# Patient Record
Sex: FEMALE | Race: WHITE | Employment: OTHER | URBAN - METROPOLITAN AREA
[De-identification: names, ages, dates, MRNs, and addresses within clinical notes are randomized per-mention and may not be internally consistent; named-entity substitution may affect disease eponyms.]

---

## 2017-08-29 PROBLEM — E55.9 VITAMIN D INSUFFICIENCY: Status: ACTIVE | Noted: 2017-08-29

## 2017-09-01 PROBLEM — E11.9 DIET-CONTROLLED DIABETES MELLITUS (HCC): Status: ACTIVE | Noted: 2017-09-01

## 2017-09-14 PROBLEM — E11.9 NEW ONSET TYPE 2 DIABETES MELLITUS (HCC): Status: ACTIVE | Noted: 2017-09-14

## 2017-10-09 ENCOUNTER — APPOINTMENT (OUTPATIENT)
Dept: MRI IMAGING | Facility: HOSPITAL | Age: 67
DRG: 065 | End: 2017-10-09
Attending: Other
Payer: MEDICARE

## 2017-10-09 ENCOUNTER — APPOINTMENT (OUTPATIENT)
Dept: GENERAL RADIOLOGY | Facility: HOSPITAL | Age: 67
DRG: 065 | End: 2017-10-09
Attending: EMERGENCY MEDICINE
Payer: MEDICARE

## 2017-10-09 ENCOUNTER — HOSPITAL ENCOUNTER (INPATIENT)
Facility: HOSPITAL | Age: 67
LOS: 1 days | Discharge: HOME | DRG: 065 | End: 2017-10-10
Attending: EMERGENCY MEDICINE | Admitting: HOSPITALIST
Payer: MEDICARE

## 2017-10-09 ENCOUNTER — APPOINTMENT (OUTPATIENT)
Dept: ULTRASOUND IMAGING | Facility: HOSPITAL | Age: 67
DRG: 065 | End: 2017-10-09
Attending: Other
Payer: MEDICARE

## 2017-10-09 ENCOUNTER — APPOINTMENT (OUTPATIENT)
Dept: CT IMAGING | Facility: HOSPITAL | Age: 67
DRG: 065 | End: 2017-10-09
Attending: EMERGENCY MEDICINE
Payer: MEDICARE

## 2017-10-09 DIAGNOSIS — I63.9 ACUTE CVA (CEREBROVASCULAR ACCIDENT) (HCC): Primary | ICD-10-CM

## 2017-10-09 PROCEDURE — 71010 XR CHEST AP PORTABLE  (CPT=71010): CPT | Performed by: EMERGENCY MEDICINE

## 2017-10-09 PROCEDURE — 70450 CT HEAD/BRAIN W/O DYE: CPT | Performed by: EMERGENCY MEDICINE

## 2017-10-09 PROCEDURE — 93880 EXTRACRANIAL BILAT STUDY: CPT | Performed by: OTHER

## 2017-10-09 PROCEDURE — 99223 1ST HOSP IP/OBS HIGH 75: CPT | Performed by: OTHER

## 2017-10-09 PROCEDURE — 70553 MRI BRAIN STEM W/O & W/DYE: CPT | Performed by: OTHER

## 2017-10-09 RX ORDER — DOCUSATE SODIUM 100 MG/1
100 CAPSULE, LIQUID FILLED ORAL 2 TIMES DAILY PRN
Status: DISCONTINUED | OUTPATIENT
Start: 2017-10-09 | End: 2017-10-10

## 2017-10-09 RX ORDER — POLYETHYLENE GLYCOL 3350 17 G/17G
17 POWDER, FOR SOLUTION ORAL DAILY PRN
Status: DISCONTINUED | OUTPATIENT
Start: 2017-10-09 | End: 2017-10-10

## 2017-10-09 RX ORDER — BISACODYL 10 MG
10 SUPPOSITORY, RECTAL RECTAL
Status: DISCONTINUED | OUTPATIENT
Start: 2017-10-09 | End: 2017-10-10

## 2017-10-09 RX ORDER — SODIUM CHLORIDE 0.9 % (FLUSH) 0.9 %
3 SYRINGE (ML) INJECTION AS NEEDED
Status: DISCONTINUED | OUTPATIENT
Start: 2017-10-09 | End: 2017-10-10

## 2017-10-09 RX ORDER — SODIUM CHLORIDE 9 MG/ML
INJECTION, SOLUTION INTRAVENOUS
Status: COMPLETED
Start: 2017-10-09 | End: 2017-10-09

## 2017-10-09 RX ORDER — ASPIRIN 325 MG
325 TABLET, DELAYED RELEASE (ENTERIC COATED) ORAL DAILY
Status: DISCONTINUED | OUTPATIENT
Start: 2017-10-10 | End: 2017-10-10

## 2017-10-09 RX ORDER — ALPRAZOLAM 0.5 MG/1
0.5 TABLET ORAL NIGHTLY
COMMUNITY
End: 2017-10-10

## 2017-10-09 RX ORDER — ACETAMINOPHEN 325 MG/1
650 TABLET ORAL EVERY 6 HOURS PRN
Status: DISCONTINUED | OUTPATIENT
Start: 2017-10-09 | End: 2017-10-10

## 2017-10-09 RX ORDER — ENOXAPARIN SODIUM 100 MG/ML
40 INJECTION SUBCUTANEOUS DAILY
Status: DISCONTINUED | OUTPATIENT
Start: 2017-10-09 | End: 2017-10-10

## 2017-10-09 RX ORDER — ASPIRIN 325 MG
325 TABLET ORAL ONCE
Status: COMPLETED | OUTPATIENT
Start: 2017-10-09 | End: 2017-10-09

## 2017-10-09 RX ORDER — ATORVASTATIN CALCIUM 20 MG/1
20 TABLET, FILM COATED ORAL NIGHTLY
Status: DISCONTINUED | OUTPATIENT
Start: 2017-10-09 | End: 2017-10-10

## 2017-10-09 RX ORDER — DEXTROSE MONOHYDRATE 25 G/50ML
50 INJECTION, SOLUTION INTRAVENOUS AS NEEDED
Status: DISCONTINUED | OUTPATIENT
Start: 2017-10-09 | End: 2017-10-10

## 2017-10-09 RX ORDER — METOCLOPRAMIDE HYDROCHLORIDE 5 MG/ML
10 INJECTION INTRAMUSCULAR; INTRAVENOUS EVERY 8 HOURS PRN
Status: DISCONTINUED | OUTPATIENT
Start: 2017-10-09 | End: 2017-10-10

## 2017-10-09 RX ORDER — ONDANSETRON 2 MG/ML
4 INJECTION INTRAMUSCULAR; INTRAVENOUS EVERY 6 HOURS PRN
Status: DISCONTINUED | OUTPATIENT
Start: 2017-10-09 | End: 2017-10-10

## 2017-10-09 RX ORDER — PHENTERMINE HYDROCHLORIDE 37.5 MG/1
37.5 TABLET ORAL
COMMUNITY
End: 2017-10-10

## 2017-10-09 NOTE — PLAN OF CARE
NEUROLOGICAL - ADULT    • Achieves stable or improved neurological status Progressing    • Achieves maximal functionality and self care    A&OX4. SPEECH CLEAR. NIHSS-0.    Progressing        Patient/Family Goals    • Patient/Family Long Term Goal    RETURN

## 2017-10-09 NOTE — SLP NOTE
ADULT SWALLOWING EVALUATION    ASSESSMENT    ASSESSMENT/OVERALL IMPRESSION:  Pt seen for a clinical swallowing evaluation secondary to new diagnosis of CVA.   The pt reports left sided facial droop and slurred speech at onset, but deficit resolved in the ER Recommendations: One pill at a time  Treatment Plan/Recommendations: Dysphagia therapy  Discharge Recommendations/Plan: Undetermined    HISTORY   MEDICAL HISTORY  Reason for Referral: Stroke protocol    Problem List  Principal Problem:    Acute CVA (cerebr clinically.  Videofluoroscopic Swallow Study is required to rule-out silent aspiration.)    Esophageal Phase of Swallow: No complaints consistent with possible esophageal involvement                GOALS  Goal #1 The patient will tolerate general consistenc

## 2017-10-09 NOTE — ED PROVIDER NOTES
Patient Seen in: [facility] AND [facility] Emergency Department    History   Patient presents with:  Dizziness (neurologic)    Stated Complaint: dizziness since thursday, confusion on and off, left side of difficult to move *    HPI    Patient is a 27-year-old f Father      cad   • Lipids Father    • Hypertension Mother    • Gracia Dire Mother    • Lipids Mother    • Diabetes Mother 61   • Cancer Mother      skin cancer   • Cancer Maternal Grandmother      skin ca   • Heart Disorder Maternal Grandfather 5 heart sounds and intact distal pulses. Pulmonary/Chest: Effort normal and breath sounds normal. No respiratory distress. Abdominal: Soft. Bowel sounds are normal. Exhibits no distension and no mass. There is no tenderness.  There is no rebound and no g these tests on the individual orders. RAINBOW DRAW GOLD   RAINBOW DRAW LAVENDER TALL (BNP)   RAINBOW DRAW BLUE   URINE CULTURE, ROUTINE     EKG    Rate, intervals and axes as noted on EKG Report.   Rate: 99  Rhythm: Sinus Rhythm  Reading: Normal

## 2017-10-09 NOTE — ED NOTES
Pt presents to ED with a c/o increasing generalized weakness, \"sleeping more than normal\", and left sided facial numbness which she sates is resolved at this time.  Pt states she was in [place] last week, admits to using \"medical marijuana, xanax, and al

## 2017-10-09 NOTE — CONSULTS
Neurology Inpatient Consult Note    Areli Mo : 3/6/1950   Referring Physician: Dr. Jesus Mai  HPI:     Areli Mo is a 79year old female who is being seen in neurologic evaluation.     Patient is being seen in evaluation for confusion OTHER SURGICAL HISTORY      Comment: breast implants,   2006: OTHER SURGICAL HISTORY      Comment: revision breast implant, repeated in several                months  No date: OTHER SURGICAL HISTORY      Comment: plastic surgery, neck lift, rhinoplasty, 132 lb   SpO2 95%   BMI 24.54 kg/m²    General: no apparent distress, pleasant and cooperative   CV: regular rate and rhythm   Lungs: clear to auscultation bilaterally  Neuro:  Mental Status: alert, speech fluent but somewhat slowed, has some difficulty wi clinical impression in lay terms with patient and family and addressed their questions and concerns; I did also discuss the role that multiple substances may have played in causing patient's stroke, and importance of refraining from further use    –Permiss

## 2017-10-09 NOTE — ED INITIAL ASSESSMENT (HPI)
dizziness since thursday, confusion on and off, left side of difficult to move on thursday but not now

## 2017-10-09 NOTE — ED INITIAL ASSESSMENT (HPI)
Pt states while on vacation Wednesday night she had edible \"Pot candy\" a xanax and lots of alcohol recently dx as diabetic but not on any medication yet

## 2017-10-09 NOTE — H&P
[facility] Hospitalist H&P     CC: Patient presents with:  Dizziness (neurologic)       PCP: Milton Thomson MD      Assessment and Plan     Hunter Escobedo is a 79year old female with PMH sig for HTN, HL, TIA, anxiety, new dx of DM, who presented with left s for HTN, HL, TIA, anxiety, new dx of DM who presented with left sided facial droop/slurred speech on 10/5 and 10/6 that resolved. Family said that it was hard to convince her to come in and since she was improving that didn't think they could push it.   [place] blepharoplasty  No date: OTHER SURGICAL HISTORY      Comment: liposuction  No date: TONSILLECTOMY     ALL:    Hydrocodone             Hallucinations  Sulfa Antibiotics       Itching     Home Medications:    Outpatient Prescriptions Marked as Taking for the present  MSK: no clubbing, no cyanosis.   No Lower extremity edema  Skin: no rashes or lesions, well perfused  Psych: mood stable, cooperative  Neuro: no facial droop noted, oriented x 3, per family personality at baseline, subtle left sided weakness, no nu

## 2017-10-10 ENCOUNTER — APPOINTMENT (OUTPATIENT)
Dept: CV DIAGNOSTICS | Facility: HOSPITAL | Age: 67
DRG: 065 | End: 2017-10-10
Attending: HOSPITALIST
Payer: MEDICARE

## 2017-10-10 VITALS
WEIGHT: 132.38 LBS | BODY MASS INDEX: 24.68 KG/M2 | DIASTOLIC BLOOD PRESSURE: 87 MMHG | TEMPERATURE: 98 F | OXYGEN SATURATION: 95 % | HEIGHT: 61.5 IN | HEART RATE: 86 BPM | RESPIRATION RATE: 20 BRPM | SYSTOLIC BLOOD PRESSURE: 108 MMHG

## 2017-10-10 PROCEDURE — 99231 SBSQ HOSP IP/OBS SF/LOW 25: CPT | Performed by: OTHER

## 2017-10-10 PROCEDURE — 93306 TTE W/DOPPLER COMPLETE: CPT | Performed by: HOSPITALIST

## 2017-10-10 NOTE — SLP NOTE
Pt off-unit for tests; unable to see for swallowing treatment at this time. Will f/u later in day, time permitting.        Connor Mendieta M.S. LEYDI/SLP  Speech-Language Pathologist  [facility]  #[id number]

## 2017-10-10 NOTE — PROGRESS NOTES
Patient denies any pain or shortness of breath. No weakness noted in upper or lower extremities. No facial droop or slurred speech noted. Vitals signs stable at this present time.  Patient seen by diabetic nutritionist. D/C iv access and returned monitor to

## 2017-10-10 NOTE — OCCUPATIONAL THERAPY NOTE
OCCUPATIONAL THERAPY QUICK EVALUATION - INPATIENT    Room Number: 700/979-M  Evaluation Date: 10/10/2017     Type of Evaluation: Initial  Presenting Problem:  (LT sided weakness)    Physician Order: IP Consult to Occupational Therapy  Reason for Therapy: drives. Pt lives with her dtr who is home .     SUBJECTIVE   \"I can even dance\"    OBJECTIVE  Precautions: None  Fall Risk: Standard fall risk    WEIGHT BEARING RESTRICTION  Weight Bearing Restriction: None    PAIN ASSESSMENT  Ratin          [facility] skilled OT is not indicated at this time.

## 2017-10-10 NOTE — DIABETES ED
[facility]   Diabetes Education Note      Trenda Ripple Patient Status Inpatient   3/6/1950  MRN [id number]  Location  [facility]  Attending Olga Ahn MD  Hospital Days # 1  PCP  Kj Fortune MD    Reason for Visit

## 2017-10-10 NOTE — PHYSICAL THERAPY NOTE
PHYSICAL THERAPY QUICK EVALUATION - INPATIENT    Room Number: 503/789-B  Evaluation Date: 10/10/2017  Presenting Problem: Acute CVA, large subacute basal ganglia infarct  Physician Order: PT Eval and Treat    Problem List  Principal Problem:    Acute CVA risk    WEIGHT BEARING RESTRICTION  Weight Bearing Restriction: None                PAIN ASSESSMENT  Ratin         RANGE OF MOTION AND STRENGTH ASSESSMENT  Upper extremity ROM and strength are within functional limits     Lower extremity ROM is within basal ganglia. Patient presented in bed with no complaint of pain, just generalized weakness. Patient demonstrates bed mobility, transfers, ambulation, and stair negotiation with Conrado.  Patient with functional gait pattern and no loss of balance with standi

## 2017-10-10 NOTE — CM/SW NOTE
SW met w/ pt and pt's dtr to discuss discharge planning. Pt lives at home w/ 15 steps in the home. Pt's dtr stated pt has crutches at home. Pt's dtr stated there are no services in the home at this time and is independent w/ ADL's and drives.  No recommenda

## 2017-10-10 NOTE — PROGRESS NOTES
Neurology Inpatient Follow-up Note      HPI:     Patient being seen in follow up. Ready to go home. Cleared by PT.       Past Medical Hisotory:  Reviewed    Medications:  Reviewed    Allergies:    Hydrocodone             Hallucinations  Sulfa Antibiotics Doppler parameters are consistent with abnormal left ventricular relaxation     (grade 1 diastolic dysfunction). 2. Atrial septum: Agitated saline contrast study showed an atrial level shunt,     in the baseline state.  There was a shunt, following an incr

## 2017-10-11 ENCOUNTER — TELEPHONE (OUTPATIENT)
Dept: CARDIOLOGY UNIT | Facility: HOSPITAL | Age: 67
End: 2017-10-11

## 2017-10-11 NOTE — DISCHARGE SUMMARY
[facility] Internal Medicine Discharge Summary   Patient ID:  Lalo   [id number]  79year old  3/6/1950    Admit date: 10/9/2017    Discharge date and time: 10/10/2017  4:42 PM     Attending Physician: No att. providers found     Primary Care Physici was hard to convince her to come in and since she was improving that didn't think they could push it.   They got her to take out her hair extensions knowing an MRI was coming and then she agreed to come to the hospital.  CT confirmed large subactue right ba cake, sweets, etc.  Sugary alcoholic drinks.   -pt wants to try diet modification still, follow up with PCP     Anxiety  -no xanax, pt states they were her friend's script     UA  -pyuria, denies dysuria  -urine culture with <10K, stop abx    Consults: LORAINE KHAN hemorrhage. Mild chronic microvascular ischemic disease. This report was communicated by telephone to Dr. Vickie Tamayo at the dictation time shown below.         Mri Brain (w+wo) (cpt=70553)    Result Date: 10/9/2017  PROCEDURE: MRI BRAIN (W+WO) (CPT=70553)  COMP fluid.  ORBITS: Limited views are unremarkable. OTHER: No abnormal meningeal or parenchymal enhancement. Dictated by (CST): Festus Angulo MD on 10/09/2017 at 20:54     Approved by (CST): Festus Angulo MD on 10/09/2017 at 21:00          CONCLUSION:  1.   Analy Garza systolic:  130.9-BC/R. ICA/VCCA: 0.6. VERTEBRALS: Antegrade flow. Normal velocities. Right Peak systolic: 34.4-PF/D. Left Peak systolic: 39.4-YB/S.    Spectral Doppler US Thresholds (Reference: Krzysztof Luis al. Radiology 2000; 677:147-264)      Juvenal Jimenez Time Coordinating Care: > than 30 minutes    Patient had opportunity to ask questions and state understand and agree with therapeutic plan as outlined

## 2017-10-12 ENCOUNTER — TELEPHONE (OUTPATIENT)
Dept: NEUROLOGY | Facility: CLINIC | Age: 67
End: 2017-10-12

## 2017-10-12 ENCOUNTER — TELEPHONE (OUTPATIENT)
Dept: MEDSURG UNIT | Facility: HOSPITAL | Age: 67
End: 2017-10-12

## 2017-10-12 NOTE — TELEPHONE ENCOUNTER
Pt marisabel from [facility] p a stroke, has appt here 10/17. She wishes to be cleared to drive and was told her doctor must complete a form from [employer]. STEPHANY called them @ [phone] and left a msg for them to fax form to us in time for pt's visit or call c info.

## 2017-10-12 NOTE — TELEPHONE ENCOUNTER
Patient states she has made appointment to go to [facility] for driving evaluation in 12/17. Patient requesting referral  order from Dr Hilaria Rubin for testing. NOV with primary Dr Tanja Bolanos 10/17/17, [facility].    Advised patient to discuss referral with primary care

## 2017-10-13 ENCOUNTER — APPOINTMENT (OUTPATIENT)
Dept: CT IMAGING | Facility: HOSPITAL | Age: 67
End: 2017-10-13
Attending: EMERGENCY MEDICINE
Payer: MEDICARE

## 2017-10-13 ENCOUNTER — TELEPHONE (OUTPATIENT)
Dept: MEDSURG UNIT | Facility: HOSPITAL | Age: 67
End: 2017-10-13

## 2017-10-13 ENCOUNTER — HOSPITAL ENCOUNTER (EMERGENCY)
Facility: HOSPITAL | Age: 67
Discharge: HOME | End: 2017-10-13
Attending: EMERGENCY MEDICINE
Payer: MEDICARE

## 2017-10-13 VITALS
HEIGHT: 61 IN | SYSTOLIC BLOOD PRESSURE: 112 MMHG | RESPIRATION RATE: 16 BRPM | HEART RATE: 72 BPM | BODY MASS INDEX: 25.02 KG/M2 | DIASTOLIC BLOOD PRESSURE: 79 MMHG | WEIGHT: 132.5 LBS | OXYGEN SATURATION: 95 % | TEMPERATURE: 98 F

## 2017-10-13 DIAGNOSIS — R51.9 NONINTRACTABLE HEADACHE, UNSPECIFIED CHRONICITY PATTERN, UNSPECIFIED HEADACHE TYPE: Primary | ICD-10-CM

## 2017-10-13 PROCEDURE — 80048 BASIC METABOLIC PNL TOTAL CA: CPT | Performed by: EMERGENCY MEDICINE

## 2017-10-13 PROCEDURE — 70450 CT HEAD/BRAIN W/O DYE: CPT | Performed by: EMERGENCY MEDICINE

## 2017-10-13 PROCEDURE — 85025 COMPLETE CBC W/AUTO DIFF WBC: CPT | Performed by: EMERGENCY MEDICINE

## 2017-10-13 PROCEDURE — 99284 EMERGENCY DEPT VISIT MOD MDM: CPT

## 2017-10-13 PROCEDURE — 36415 COLL VENOUS BLD VENIPUNCTURE: CPT

## 2017-10-13 NOTE — ED PROVIDER NOTES
Patient Seen in: [facility] AND [facility] Emergency Department    History   Patient presents with:  Headache (neurologic)    Stated Complaint: head ache here on tuesday     HPI    26-year-old female with history of hyperlipidemia, TIA, hypertension, recently ho of Onset   • Hypertension Father    • Heart Disorder Father      cad   • Lipids Father    • Hypertension Mother    • Neomia Glory Mother    • Lipids Mother    • Diabetes Mother 61   • Cancer Mother      skin cancer   • Cancer Maternal Grandmother 98.2 °F (36.8 °C) [10/13/17 0030]  Temp src: Oral [10/13/17 0057]  SpO2: 96 % [10/13/17 0030]  O2 Device: None (Room air) [10/13/17 0057]    Current:/79   Pulse 72   Temp 98.3 °F (36.8 °C) (Oral)   Resp 16   Ht 154.9 cm (5' 1\")   Wt 60.1 kg   SpO2 9 290 275 - 295 mOsm/kg   GFR, Non-African American >60 >=60   GFR, -American >60 >=60   -CBC W/ DIFFERENTIAL   Collection Time: 10/13/17  1:05 AM   Result Value Ref Range   WBC 6.7 4.0 - 11.0 K/UL   RBC 4.57 3.70 - 5.40 M/UL   HGB 14.0 12.0 - 16.0 g/ reassuring, no leukocytosis, no anemia, CT head without ICH or significant change  -pt comfortable going home knowing there is no bleed - does not want pain medication for headache    The patient was informed of their elevated blood pressure reading in the [address]  [phone]    Schedule an appointment as soon as possible for a visit in 2 days  As needed      Medications Prescribed:  Current Discharge Medication List

## 2017-10-17 PROCEDURE — 87077 CULTURE AEROBIC IDENTIFY: CPT | Performed by: FAMILY MEDICINE

## 2017-10-17 PROCEDURE — 36415 COLL VENOUS BLD VENIPUNCTURE: CPT | Performed by: FAMILY MEDICINE

## 2017-10-17 PROCEDURE — 87186 SC STD MICRODIL/AGAR DIL: CPT | Performed by: FAMILY MEDICINE

## 2017-10-17 PROCEDURE — 82570 ASSAY OF URINE CREATININE: CPT | Performed by: FAMILY MEDICINE

## 2017-10-17 PROCEDURE — 87086 URINE CULTURE/COLONY COUNT: CPT | Performed by: FAMILY MEDICINE

## 2017-10-17 PROCEDURE — 82043 UR ALBUMIN QUANTITATIVE: CPT | Performed by: FAMILY MEDICINE

## 2017-10-20 ENCOUNTER — OFFICE VISIT (OUTPATIENT)
Dept: NEUROLOGY | Facility: CLINIC | Age: 67
End: 2017-10-20

## 2017-10-20 ENCOUNTER — TELEPHONE (OUTPATIENT)
Dept: NEUROLOGY | Facility: CLINIC | Age: 67
End: 2017-10-20

## 2017-10-20 VITALS
SYSTOLIC BLOOD PRESSURE: 116 MMHG | WEIGHT: 132 LBS | RESPIRATION RATE: 16 BRPM | BODY MASS INDEX: 24.29 KG/M2 | DIASTOLIC BLOOD PRESSURE: 64 MMHG | HEIGHT: 62 IN | HEART RATE: 84 BPM

## 2017-10-20 DIAGNOSIS — R48.2 APRAXIA: ICD-10-CM

## 2017-10-20 DIAGNOSIS — I63.9 ACUTE CVA (CEREBROVASCULAR ACCIDENT) (HCC): Primary | ICD-10-CM

## 2017-10-20 DIAGNOSIS — R90.89 ABNORMAL BRAIN MRI: ICD-10-CM

## 2017-10-20 PROCEDURE — 99214 OFFICE O/P EST MOD 30 MIN: CPT | Performed by: OTHER

## 2017-10-20 NOTE — TELEPHONE ENCOUNTER
Called patient to advise insurance was verified and PT / OT MRI Brain is a covered benefit and does not require authorization, for the Driving Eval, her daughter will take care of that , patient thanked me for my call

## 2017-10-20 NOTE — PROGRESS NOTES
[facility]    Elyn Lennox is a 79year old female. HPI:     Patient is being seen in hospital follow-up. She is accompanied by her son and her daughter to the visit today, who help provide history.     I initially saw endorses feeling very fatigued. She attributes this to having stopped her diet pills. She has noticed it takes her a lot longer to do everyday things such as finding her hair brush or getting ready to go out. She is not currently driving.   She does endo the prior study there has been no significant interval change. Bubble study not done with prior Echo 3/12/2014      Noncontrast head CT 10/9/2017  CONCLUSION:      Large subacute right basal ganglia infarct.  No acute hemorrhage.     Mild chronic microvasc see HPI  PSYCH: see HPI  NEURO: see HPI      PHYSICAL EXAM:     Vitals:  /64 (BP Location: Right arm, Patient Position: Sitting, Cuff Size: adult)   Pulse 84   Resp 16   Ht 62\"   Wt 132 lb   Breastfeeding?  No   BMI 24.14 kg/m²    General: no apparen to patient's anxiety and depression, we did discuss several treatment options including referral to counseling as well as trial of antidepressant (would favor SSRI); however, patient prefers to hold off for the time being, and I did advise that she may hav

## 2017-10-25 ENCOUNTER — TELEPHONE (OUTPATIENT)
Dept: NEUROLOGY | Facility: CLINIC | Age: 67
End: 2017-10-25

## 2017-10-25 RX ORDER — BUSPIRONE HYDROCHLORIDE 5 MG/1
5 TABLET ORAL 2 TIMES DAILY
Qty: 60 TABLET | Refills: 1 | Status: SHIPPED | OUTPATIENT
Start: 2017-10-25 | End: 2017-12-18

## 2017-10-25 NOTE — TELEPHONE ENCOUNTER
Left detailed message voice mail with information from Dr Steen`s note 10/25/17. Advised to call office back with update or any questions.

## 2017-10-25 NOTE — TELEPHONE ENCOUNTER
Spoke to patient. She states that she saw Dr. Shannon Alex last Friday and an antidepressant was suggested. She admits that she was against it initially. She has since spoke to her sister who is on buspirone BID.  She states that it is a low dose and her sister

## 2017-10-25 NOTE — TELEPHONE ENCOUNTER
This would be a reasonable plan. Prescription for BuSpar, low-dose dose of 5 mg twice daily, sent to the pharmacy. Patient can give us an update as to how she is doing on Friday or next week.   Bear in mind BuSpar is used primarily for anxiety, but should

## 2017-10-31 ENCOUNTER — MED REC SCAN ONLY (OUTPATIENT)
Dept: NEUROLOGY | Facility: CLINIC | Age: 67
End: 2017-10-31

## 2017-10-31 ENCOUNTER — TELEPHONE (OUTPATIENT)
Dept: NEUROLOGY | Facility: CLINIC | Age: 67
End: 2017-10-31

## 2017-11-07 ENCOUNTER — PATIENT MESSAGE (OUTPATIENT)
Dept: NEUROLOGY | Facility: CLINIC | Age: 67
End: 2017-11-07

## 2017-11-07 NOTE — TELEPHONE ENCOUNTER
From: Mohini De La Rosa  To:  Lauren Gregory MD  Sent: 11/7/2017 2:20 PM CST  Subject: Other     I have a question for the doctor before my stroke I was taking phentermine for energy and weight loss I was wondering since they in turn has found out I am a

## 2017-11-15 ENCOUNTER — HOSPITAL ENCOUNTER (OUTPATIENT)
Dept: ENDOCRINOLOGY | Facility: HOSPITAL | Age: 67
Discharge: HOME | End: 2017-11-15
Attending: FAMILY MEDICINE
Payer: MEDICARE

## 2017-11-15 VITALS — WEIGHT: 136.19 LBS | BODY MASS INDEX: 25 KG/M2

## 2017-11-15 DIAGNOSIS — E11.9 NEW ONSET TYPE 2 DIABETES MELLITUS (HCC): Primary | ICD-10-CM

## 2017-11-15 NOTE — PROGRESS NOTES
Jamil Terrell  : 3/6/1950 attended initial assessment for Diabetes Education:    Date: 11/15/2017   Start time: 11:00 am End time: 11:55 am    Her daughter is monitoring her blood glucose at home daily. She reports values range 130-155 mg/dl.   She due to recent CVA. Prescriptions sent to preferred pharmacy for blood glucose meter, test strips and lancets per protocol. Written materials provided for all areas covered. Patient verbalized understanding and has no further questions at this time.

## 2017-11-28 ENCOUNTER — HOSPITAL ENCOUNTER (OUTPATIENT)
Dept: MRI IMAGING | Age: 67
Discharge: HOME | End: 2017-11-28
Attending: Other
Payer: MEDICARE

## 2017-11-28 DIAGNOSIS — I63.9 ACUTE CVA (CEREBROVASCULAR ACCIDENT) (HCC): ICD-10-CM

## 2017-11-28 PROCEDURE — 70553 MRI BRAIN STEM W/O & W/DYE: CPT | Performed by: OTHER

## 2017-11-28 PROCEDURE — 82565 ASSAY OF CREATININE: CPT

## 2017-11-28 PROCEDURE — A9575 INJ GADOTERATE MEGLUMI 0.1ML: HCPCS | Performed by: OTHER

## 2017-11-29 ENCOUNTER — LAB ENCOUNTER (OUTPATIENT)
Dept: LAB | Age: 67
End: 2017-11-29
Attending: FAMILY MEDICINE
Payer: MEDICARE

## 2017-11-29 ENCOUNTER — TELEPHONE (OUTPATIENT)
Dept: NEUROLOGY | Facility: CLINIC | Age: 67
End: 2017-11-29

## 2017-11-29 DIAGNOSIS — N39.0 URINARY TRACT INFECTION WITHOUT HEMATURIA, SITE UNSPECIFIED: ICD-10-CM

## 2017-11-29 PROCEDURE — 87147 CULTURE TYPE IMMUNOLOGIC: CPT

## 2017-11-29 PROCEDURE — 81001 URINALYSIS AUTO W/SCOPE: CPT

## 2017-11-29 PROCEDURE — 87086 URINE CULTURE/COLONY COUNT: CPT

## 2017-11-30 ENCOUNTER — TELEPHONE (OUTPATIENT)
Dept: NEUROLOGY | Facility: CLINIC | Age: 67
End: 2017-11-30

## 2017-11-30 NOTE — TELEPHONE ENCOUNTER
S/w pt she would like Dr. Doris Aguillon to know that she started to take CBD oil drops twice daily for her Diabetes.

## 2017-12-01 NOTE — TELEPHONE ENCOUNTER
S/w Olga, discussed MRI brain results and further steps from there. Updated her daughter, who was in car w/ her. She also asked I contact her son Sole Nicholson; called him, Maldonado.   mychart msg sent to pt summarizing our phone conversation, per pt request.

## 2017-12-06 ENCOUNTER — HOSPITAL ENCOUNTER (OUTPATIENT)
Dept: ENDOCRINOLOGY | Facility: HOSPITAL | Age: 67
Discharge: HOME | End: 2017-12-06
Attending: FAMILY MEDICINE
Payer: MEDICARE

## 2017-12-06 VITALS — HEIGHT: 62 IN | WEIGHT: 138.13 LBS | BODY MASS INDEX: 25.42 KG/M2

## 2017-12-06 DIAGNOSIS — E11.9 DIET-CONTROLLED DIABETES MELLITUS (HCC): Primary | ICD-10-CM

## 2017-12-06 DIAGNOSIS — I63.9 ACUTE CVA (CEREBROVASCULAR ACCIDENT) (HCC): ICD-10-CM

## 2017-12-06 NOTE — PROGRESS NOTES
Raul Sender  : 3/6/1950 was seen for Diabetic Education: Follow up:    Date: 2017   Start time: 1550   End time:     Assessment:     Assessment: Ht 62\"   Wt 138 lb 1.6 oz   BMI 25.26 kg/m²         HbA1c (%)   Date Value   2017 6. /HPF   Ca Oxalate Crystals Moderate None seen /HPF   -URINE CULTURE, ROUTINE   Result Value Ref Range   Urine Culture 10,000 - 50,000 CFU/ML Streptococcus agalactiae (Group B beta strep) (A)          Healthy Eating  Reviewed basic nutrition concepts for [facility]

## 2017-12-07 ENCOUNTER — TELEPHONE (OUTPATIENT)
Dept: NEUROLOGY | Facility: CLINIC | Age: 67
End: 2017-12-07

## 2017-12-11 ENCOUNTER — MED REC SCAN ONLY (OUTPATIENT)
Dept: NEUROLOGY | Facility: CLINIC | Age: 67
End: 2017-12-11

## 2017-12-18 ENCOUNTER — OFFICE VISIT (OUTPATIENT)
Dept: NEUROLOGY | Facility: CLINIC | Age: 67
End: 2017-12-18

## 2017-12-18 VITALS
DIASTOLIC BLOOD PRESSURE: 68 MMHG | SYSTOLIC BLOOD PRESSURE: 100 MMHG | RESPIRATION RATE: 16 BRPM | WEIGHT: 132 LBS | HEART RATE: 76 BPM | HEIGHT: 62 IN | BODY MASS INDEX: 24.29 KG/M2

## 2017-12-18 DIAGNOSIS — I63.9 ACUTE CVA (CEREBROVASCULAR ACCIDENT) (HCC): Primary | ICD-10-CM

## 2017-12-18 DIAGNOSIS — R48.2 APRAXIA: ICD-10-CM

## 2017-12-18 PROCEDURE — 99214 OFFICE O/P EST MOD 30 MIN: CPT | Performed by: OTHER

## 2017-12-18 RX ORDER — BUSPIRONE HYDROCHLORIDE 5 MG/1
5 TABLET ORAL 2 TIMES DAILY
Qty: 60 TABLET | Refills: 1 | Status: SHIPPED | OUTPATIENT
Start: 2017-12-18 | End: 2018-03-12

## 2017-12-18 RX ORDER — BUSPIRONE HYDROCHLORIDE 5 MG/1
TABLET ORAL
Qty: 60 TABLET | Refills: 0 | Status: CANCELLED | OUTPATIENT
Start: 2017-12-18

## 2017-12-18 NOTE — TELEPHONE ENCOUNTER
L/m advising Pt. insurance was verified and Driving evaluation is a partially covered benefit. Evaluation consists of 2 part test. [facility] will submit to Medicare for 1st part however, 2nd part is not a covered benefit and the cost is $190. Detailed information was provided for scheduling appt.

## 2017-12-18 NOTE — PROGRESS NOTES
[facility]    Domenico Smith is a 79year old female. HPI:     Patient is being seen in follow-up. She is accompanied by her son to the visit today. I saw her in clinic last 10/20/2017.   She has been doing very well sin 100 strip Rfl: 1   GELY MICROLET LANCETS Does not apply Misc 1 lancet by Finger stick route 2 (two) times daily. Use as directed. Disp: 1 Box Rfl: 1   BusPIRone HCl 5 MG Oral Tab Take 1 tablet (5 mg total) by mouth 2 (two) times daily.  Disp: 60 tablet Rfl agent    –Repeat MRI brain reviewed with patient and son    Brenna Chamberlain can continue BuSpar for anxiety      2.  Apraxia    –Improved; however, as her children do still have some slight concerns, will recommend behind the wheel driving evaluation before resumi

## 2017-12-19 ENCOUNTER — LAB ENCOUNTER (OUTPATIENT)
Dept: LAB | Age: 67
End: 2017-12-19
Attending: FAMILY MEDICINE
Payer: MEDICARE

## 2017-12-19 DIAGNOSIS — N39.0 URINARY TRACT INFECTION WITHOUT HEMATURIA, SITE UNSPECIFIED: ICD-10-CM

## 2017-12-19 PROCEDURE — 87086 URINE CULTURE/COLONY COUNT: CPT

## 2018-01-02 ENCOUNTER — APPOINTMENT (OUTPATIENT)
Dept: ENDOCRINOLOGY | Facility: HOSPITAL | Age: 68
End: 2018-01-02
Attending: FAMILY MEDICINE
Payer: MEDICARE

## 2018-01-02 ENCOUNTER — TELEPHONE (OUTPATIENT)
Dept: NEUROLOGY | Facility: CLINIC | Age: 68
End: 2018-01-02

## 2018-01-03 ENCOUNTER — MED REC SCAN ONLY (OUTPATIENT)
Dept: NEUROLOGY | Facility: CLINIC | Age: 68
End: 2018-01-03

## 2018-01-09 ENCOUNTER — TELEPHONE (OUTPATIENT)
Dept: NEUROLOGY | Facility: CLINIC | Age: 68
End: 2018-01-09

## 2018-01-13 ENCOUNTER — HOSPITAL ENCOUNTER (EMERGENCY)
Facility: HOSPITAL | Age: 68
Discharge: HOME | End: 2018-01-13
Attending: EMERGENCY MEDICINE
Payer: MEDICARE

## 2018-01-13 VITALS
SYSTOLIC BLOOD PRESSURE: 124 MMHG | TEMPERATURE: 97 F | DIASTOLIC BLOOD PRESSURE: 63 MMHG | OXYGEN SATURATION: 93 % | HEART RATE: 75 BPM | RESPIRATION RATE: 18 BRPM

## 2018-01-13 DIAGNOSIS — S61.259A DOG BITE OF MULTIPLE SITES OF RIGHT HAND AND FINGERS, INITIAL ENCOUNTER: Primary | ICD-10-CM

## 2018-01-13 DIAGNOSIS — S61.451A DOG BITE OF MULTIPLE SITES OF RIGHT HAND AND FINGERS, INITIAL ENCOUNTER: Primary | ICD-10-CM
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
## 2018-01-13 PROCEDURE — 99283 EMERGENCY DEPT VISIT LOW MDM: CPT

## 2018-01-13 RX ORDER — HYDROCODONE BITARTRATE AND ACETAMINOPHEN 5; 325 MG/1; MG/1
1 TABLET ORAL ONCE
Status: COMPLETED | OUTPATIENT
Start: 2018-01-13 | End: 2018-01-13

## 2018-01-13 RX ORDER — HYDROCODONE BITARTRATE AND ACETAMINOPHEN 5; 325 MG/1; MG/1
1-2 TABLET ORAL EVERY 4 HOURS PRN
Qty: 10 TABLET | Refills: 0 | Status: SHIPPED | OUTPATIENT
Start: 2018-01-13 | End: 2018-01-18 | Stop reason: ALTCHOICE

## 2018-01-13 RX ORDER — ACETAMINOPHEN AND CODEINE PHOSPHATE 300; 30 MG/1; MG/1
1 TABLET ORAL ONCE
Status: DISCONTINUED | OUTPATIENT
Start: 2018-01-13 | End: 2018-01-13

## 2018-01-13 RX ORDER — AMOXICILLIN AND CLAVULANATE POTASSIUM 875; 125 MG/1; MG/1
1 TABLET, FILM COATED ORAL 2 TIMES DAILY
Qty: 20 TABLET | Refills: 0 | Status: SHIPPED | OUTPATIENT
Start: 2018-01-13 | End: 2018-01-23

## 2018-01-14 NOTE — ED PROVIDER NOTES
Patient Seen in: [facility] AND [facility] Emergency Department    History   Patient presents with:  Bite (integumentary)    Stated Complaint: dog bite to right hand     HPI    Patient is a 26-year-old right-handed female who presents with dog bite wound that oc liquor: 0 - 7 per week      Review of Systems    Positive for stated complaint: dog bite to right hand   Other systems are as noted in HPI. Constitutional and vital signs reviewed. All other systems reviewed and negative except as noted above.     Raymond Tab  Take 1 tablet by mouth 2 (two) times daily. Qty: 20 tablet Refills: 0    HYDROcodone-acetaminophen 5-325 MG Oral Tab  Take 1-2 tablets by mouth every 4 (four) hours as needed for Pain.   Qty: 10 tablet Refills: 0

## 2018-02-25 ENCOUNTER — TELEPHONE (OUTPATIENT)
Dept: NEUROLOGY | Facility: CLINIC | Age: 68
End: 2018-02-25

## 2018-02-26 ENCOUNTER — MED REC SCAN ONLY (OUTPATIENT)
Dept: NEUROLOGY | Facility: CLINIC | Age: 68
End: 2018-02-26

## 2018-03-12 RX ORDER — BUSPIRONE HYDROCHLORIDE 5 MG/1
TABLET ORAL
Qty: 60 TABLET | Refills: 0 | Status: SHIPPED | OUTPATIENT
Start: 2018-03-12 | End: 2018-06-06

## 2018-03-12 NOTE — TELEPHONE ENCOUNTER
Refill request for buspirone 5 mg, BID, #60, 1 refill    LOV: 12/18/17  NOV: None  Last refilled on 12/18/17 with 1 refill

## 2018-06-06 RX ORDER — BUSPIRONE HYDROCHLORIDE 5 MG/1
5 TABLET ORAL 2 TIMES DAILY
Qty: 60 TABLET | Refills: 0 | Status: SHIPPED | OUTPATIENT
Start: 2018-06-06 | End: 2018-10-24

## 2018-06-06 NOTE — TELEPHONE ENCOUNTER
Medication request: Buspirone 5 mg, Take 1 tablet by mouth twice daily.  Qt 60 Refills 0    LOV: 12/18/17  NOV: None    Last refill: 3/12/18

## 2018-06-12 ENCOUNTER — PATIENT MESSAGE (OUTPATIENT)
Dept: NEUROLOGY | Facility: CLINIC | Age: 68
End: 2018-06-12

## 2018-06-12 NOTE — TELEPHONE ENCOUNTER
From: Pako Pierson  To: Brittney Martell MD  Sent: 6/12/2018 10:15 AM CDT  Subject: Non-Urgent Medical Question    Good morning,   I am going to [facility] in November. The plane ride will be about 13 hours. I was wondering if this is safe for me.  Is there

## 2018-10-24 ENCOUNTER — OFFICE VISIT (OUTPATIENT)
Dept: NEUROLOGY | Facility: CLINIC | Age: 68
End: 2018-10-24
Payer: MEDICARE

## 2018-10-24 VITALS
HEIGHT: 62 IN | SYSTOLIC BLOOD PRESSURE: 108 MMHG | BODY MASS INDEX: 25.4 KG/M2 | HEART RATE: 70 BPM | WEIGHT: 138 LBS | DIASTOLIC BLOOD PRESSURE: 60 MMHG

## 2018-10-24 DIAGNOSIS — Z86.73 HISTORY OF ARTERIAL ISCHEMIC STROKE: Primary | ICD-10-CM

## 2018-10-24 DIAGNOSIS — F41.9 ANXIETY: ICD-10-CM

## 2018-10-24 PROCEDURE — 99214 OFFICE O/P EST MOD 30 MIN: CPT | Performed by: OTHER

## 2018-10-24 RX ORDER — DIAZEPAM 2 MG/1
TABLET ORAL
Qty: 4 TABLET | Refills: 0 | Status: SHIPPED | OUTPATIENT
Start: 2018-10-24 | End: 2019-06-27

## 2018-10-24 RX ORDER — DIAZEPAM 2 MG/1
TABLET ORAL
Qty: 10 TABLET | Refills: 0 | Status: SHIPPED | OUTPATIENT
Start: 2018-10-24 | End: 2018-10-24

## 2018-10-27 NOTE — PROGRESS NOTES
[facility]    Cortes Ruiz is a 76year old female. HPI:     Patient is being seen in follow-up. She comes alone to the visit today. I saw her in clinic last since December 2017.   In the interim, we have been in conta tablet Rfl: 0   ATORVASTATIN 20 MG Oral Tab TAKE 1 TABLET BY MOUTH EVERY EVENING Disp: 90 tablet Rfl: 0   LISINOPRIL 20 MG Oral Tab TAKE 1 TABLET(20 MG) BY MOUTH EVERY DAY Disp: 90 tablet Rfl: 0   Glucose Blood (GELY CONTOUR NEXT TEST) In Vitro Strip 1 st for LDL goal less than 70    –In anticipation of her travel, patient provided with paperwork stating her diagnosis (history of ischemic stroke) as well as her medication list      2.   Anxiety    –Improved; patient now off BuSpar    –For situational anxiety

## 2019-06-27 PROBLEM — E11.65 UNCONTROLLED TYPE 2 DIABETES MELLITUS WITH HYPERGLYCEMIA (HCC): Status: ACTIVE | Noted: 2017-09-01

## 2019-06-27 PROBLEM — Z86.73 HISTORY OF CVA (CEREBROVASCULAR ACCIDENT): Status: ACTIVE | Noted: 2017-10-09

## 2020-07-14 ENCOUNTER — HOSPITAL ENCOUNTER (INPATIENT)
Facility: HOSPITAL | Age: 70
LOS: 2 days | Discharge: HOME | DRG: 177 | End: 2020-07-16
Attending: EMERGENCY MEDICINE | Admitting: HOSPITALIST
Payer: MEDICARE

## 2020-07-14 ENCOUNTER — APPOINTMENT (OUTPATIENT)
Dept: GENERAL RADIOLOGY | Facility: HOSPITAL | Age: 70
DRG: 177 | End: 2020-07-14
Attending: EMERGENCY MEDICINE
Payer: MEDICARE

## 2020-07-14 DIAGNOSIS — U07.1 COVID-19 VIRUS DETECTED: Primary | ICD-10-CM

## 2020-07-14 DIAGNOSIS — R09.02 HYPOXIA: ICD-10-CM

## 2020-07-14 DIAGNOSIS — J18.9 FOCAL PNEUMONIA: ICD-10-CM

## 2020-07-14 PROBLEM — J12.82 PNEUMONIA DUE TO COVID-19 VIRUS: Status: ACTIVE | Noted: 2020-07-14

## 2020-07-14 LAB
ALBUMIN SERPL-MCNC: 3 G/DL (ref 3.4–5)
ALBUMIN/GLOB SERPL: 0.8 {RATIO} (ref 1–2)
ALP LIVER SERPL-CCNC: 53 U/L (ref 55–142)
ALT SERPL-CCNC: 29 U/L (ref 13–56)
ANION GAP SERPL CALC-SCNC: 8 MMOL/L (ref 0–18)
AST SERPL-CCNC: 49 U/L (ref 15–37)
BASOPHILS # BLD AUTO: 0.01 X10(3) UL (ref 0–0.2)
BASOPHILS NFR BLD AUTO: 0.2 %
BILIRUB SERPL-MCNC: 0.9 MG/DL (ref 0.1–2)
BUN BLD-MCNC: 21 MG/DL (ref 7–18)
BUN/CREAT SERPL: 23.6 (ref 10–20)
CALCIUM BLD-MCNC: 8.8 MG/DL (ref 8.5–10.1)
CHLORIDE SERPL-SCNC: 101 MMOL/L (ref 98–112)
CO2 SERPL-SCNC: 25 MMOL/L (ref 21–32)
CREAT BLD-MCNC: 0.89 MG/DL (ref 0.55–1.02)
CRP SERPL-MCNC: 14 MG/DL (ref ?–0.3)
D DIMER PPP FEU-MCNC: 1.31 UG/ML FEU (ref ?–0.7)
DEPRECATED HBV CORE AB SER IA-ACNC: 749.8 NG/ML (ref 18–340)
DEPRECATED RDW RBC AUTO: 42.2 FL (ref 35.1–46.3)
EOSINOPHIL # BLD AUTO: 0 X10(3) UL (ref 0–0.7)
EOSINOPHIL NFR BLD AUTO: 0 %
ERYTHROCYTE [DISTWIDTH] IN BLOOD BY AUTOMATED COUNT: 13.5 % (ref 11–15)
EST. AVERAGE GLUCOSE BLD GHB EST-MCNC: 183 MG/DL (ref 68–126)
GLOBULIN PLAS-MCNC: 4 G/DL (ref 2.8–4.4)
GLUCOSE BLD-MCNC: 151 MG/DL (ref 70–99)
GLUCOSE BLDC GLUCOMTR-MCNC: 118 MG/DL (ref 70–99)
GLUCOSE BLDC GLUCOMTR-MCNC: 156 MG/DL (ref 70–99)
HBA1C MFR BLD HPLC: 8 % (ref ?–5.7)
HCT VFR BLD AUTO: 38.7 % (ref 35–48)
HGB BLD-MCNC: 13.7 G/DL (ref 12–16)
IMM GRANULOCYTES # BLD AUTO: 0.06 X10(3) UL (ref 0–1)
IMM GRANULOCYTES NFR BLD: 1 %
LDH SERPL L TO P-CCNC: 402 U/L
LYMPHOCYTES # BLD AUTO: 1.1 X10(3) UL (ref 1–4)
LYMPHOCYTES NFR BLD AUTO: 17.7 %
M PROTEIN MFR SERPL ELPH: 7 G/DL (ref 6.4–8.2)
MCH RBC QN AUTO: 30.2 PG (ref 26–34)
MCHC RBC AUTO-ENTMCNC: 35.4 G/DL (ref 31–37)
MCV RBC AUTO: 85.4 FL (ref 80–100)
MONOCYTES # BLD AUTO: 0.24 X10(3) UL (ref 0.1–1)
MONOCYTES NFR BLD AUTO: 3.9 %
NEUTROPHILS # BLD AUTO: 4.79 X10 (3) UL (ref 1.5–7.7)
NEUTROPHILS # BLD AUTO: 4.79 X10(3) UL (ref 1.5–7.7)
NEUTROPHILS NFR BLD AUTO: 77.2 %
NT-PROBNP SERPL-MCNC: 112 PG/ML (ref ?–125)
OSMOLALITY SERPL CALC.SUM OF ELEC: 284 MOSM/KG (ref 275–295)
PLATELET # BLD AUTO: 215 10(3)UL (ref 150–450)
POTASSIUM SERPL-SCNC: 3.7 MMOL/L (ref 3.5–5.1)
PROCALCITONIN SERPL-MCNC: 0.2 NG/ML (ref ?–0.16)
RBC # BLD AUTO: 4.53 X10(6)UL (ref 3.8–5.3)
SARS-COV-2 RNA RESP QL NAA+PROBE: DETECTED
SODIUM SERPL-SCNC: 134 MMOL/L (ref 136–145)
TROPONIN I SERPL-MCNC: <0.045 NG/ML (ref ?–0.04)
WBC # BLD AUTO: 6.2 X10(3) UL (ref 4–11)

## 2020-07-14 PROCEDURE — 82728 ASSAY OF FERRITIN: CPT | Performed by: EMERGENCY MEDICINE

## 2020-07-14 PROCEDURE — 85025 COMPLETE CBC W/AUTO DIFF WBC: CPT

## 2020-07-14 PROCEDURE — 84145 PROCALCITONIN (PCT): CPT | Performed by: EMERGENCY MEDICINE

## 2020-07-14 PROCEDURE — 83615 LACTATE (LD) (LDH) ENZYME: CPT | Performed by: EMERGENCY MEDICINE

## 2020-07-14 PROCEDURE — 36415 COLL VENOUS BLD VENIPUNCTURE: CPT

## 2020-07-14 PROCEDURE — 82962 GLUCOSE BLOOD TEST: CPT

## 2020-07-14 PROCEDURE — 83880 ASSAY OF NATRIURETIC PEPTIDE: CPT | Performed by: EMERGENCY MEDICINE

## 2020-07-14 PROCEDURE — 86140 C-REACTIVE PROTEIN: CPT | Performed by: EMERGENCY MEDICINE

## 2020-07-14 PROCEDURE — 96365 THER/PROPH/DIAG IV INF INIT: CPT

## 2020-07-14 PROCEDURE — 99285 EMERGENCY DEPT VISIT HI MDM: CPT

## 2020-07-14 PROCEDURE — 83036 HEMOGLOBIN GLYCOSYLATED A1C: CPT | Performed by: HOSPITALIST

## 2020-07-14 PROCEDURE — 96367 TX/PROPH/DG ADDL SEQ IV INF: CPT

## 2020-07-14 PROCEDURE — 84484 ASSAY OF TROPONIN QUANT: CPT | Performed by: EMERGENCY MEDICINE

## 2020-07-14 PROCEDURE — 80053 COMPREHEN METABOLIC PANEL: CPT | Performed by: EMERGENCY MEDICINE

## 2020-07-14 PROCEDURE — 85025 COMPLETE CBC W/AUTO DIFF WBC: CPT | Performed by: EMERGENCY MEDICINE

## 2020-07-14 PROCEDURE — 87040 BLOOD CULTURE FOR BACTERIA: CPT | Performed by: EMERGENCY MEDICINE

## 2020-07-14 PROCEDURE — 71045 X-RAY EXAM CHEST 1 VIEW: CPT | Performed by: EMERGENCY MEDICINE

## 2020-07-14 PROCEDURE — 85379 FIBRIN DEGRADATION QUANT: CPT | Performed by: EMERGENCY MEDICINE

## 2020-07-14 PROCEDURE — 80053 COMPREHEN METABOLIC PANEL: CPT

## 2020-07-14 RX ORDER — ACETAMINOPHEN 325 MG/1
650 TABLET ORAL EVERY 6 HOURS PRN
Status: DISCONTINUED | OUTPATIENT
Start: 2020-07-14 | End: 2020-07-16

## 2020-07-14 RX ORDER — ASPIRIN 325 MG
325 TABLET, DELAYED RELEASE (ENTERIC COATED) ORAL DAILY
Status: DISCONTINUED | OUTPATIENT
Start: 2020-07-15 | End: 2020-07-16

## 2020-07-14 RX ORDER — DEXTROSE MONOHYDRATE 25 G/50ML
50 INJECTION, SOLUTION INTRAVENOUS
Status: DISCONTINUED | OUTPATIENT
Start: 2020-07-14 | End: 2020-07-16

## 2020-07-14 RX ORDER — ATORVASTATIN CALCIUM 20 MG/1
20 TABLET, FILM COATED ORAL NIGHTLY
COMMUNITY
End: 2020-09-01

## 2020-07-14 RX ORDER — ONDANSETRON 2 MG/ML
4 INJECTION INTRAMUSCULAR; INTRAVENOUS EVERY 6 HOURS PRN
Status: DISCONTINUED | OUTPATIENT
Start: 2020-07-14 | End: 2020-07-16

## 2020-07-14 RX ORDER — AZITHROMYCIN 250 MG/1
500 TABLET, FILM COATED ORAL
Status: DISCONTINUED | OUTPATIENT
Start: 2020-07-15 | End: 2020-07-16

## 2020-07-14 RX ORDER — ENOXAPARIN SODIUM 100 MG/ML
40 INJECTION SUBCUTANEOUS DAILY
Status: DISCONTINUED | OUTPATIENT
Start: 2020-07-14 | End: 2020-07-16

## 2020-07-14 RX ORDER — ATORVASTATIN CALCIUM 20 MG/1
20 TABLET, FILM COATED ORAL NIGHTLY
Status: DISCONTINUED | OUTPATIENT
Start: 2020-07-14 | End: 2020-07-16

## 2020-07-14 RX ORDER — METOCLOPRAMIDE HYDROCHLORIDE 5 MG/ML
10 INJECTION INTRAMUSCULAR; INTRAVENOUS EVERY 8 HOURS PRN
Status: DISCONTINUED | OUTPATIENT
Start: 2020-07-14 | End: 2020-07-16

## 2020-07-14 RX ORDER — SODIUM CHLORIDE 0.9 % (FLUSH) 0.9 %
3 SYRINGE (ML) INJECTION AS NEEDED
Status: DISCONTINUED | OUTPATIENT
Start: 2020-07-14 | End: 2020-07-16

## 2020-07-14 NOTE — ED PROVIDER NOTES
Patient Seen in: [facility] AND [facility] Emergency Department      History   Patient presents with:  Nausea/Vomiting/Diarrhea    Stated Complaint: COVID+ vomiting/diarrhea not keeping anything down    HPI    70-year-old female who got sick 10 days ago and test HPI.  Constitutional and vital signs reviewed. All other systems reviewed and negative except as noted above.     Physical Exam     ED Triage Vitals [07/14/20 1312]   /80   Pulse 107   Resp 22   Temp 98.9 °F (37.2 °C)   Temp src    SpO2 92 %   O2 Please view results for these tests on the individual orders.    LEGIONELLA URINE AG SEROGRP 1   LDH   C-REACTIVE PROTEIN   FERRITIN   PROCALCITONIN   PRO BETA NATRIURETIC PEPTIDE   D-DIMER   URINALYSIS WITH CULTURE REFLEX   TROPONIN I   RAINBOW Pt was 90% on RA on my exam.  She ambulated slowly, highest O2 sat was 93% but was very fatigued. Her x-ray does show focal pneumonia so she will get blood cultures to be covered for possible community-acquired pneumonia.   [facility]

## 2020-07-14 NOTE — ED INITIAL ASSESSMENT (HPI)
Dx Covid + this past week after being in [place] with last week. Pt just feels like she is not getting better. No vomiting since last week will have SOB at times.

## 2020-07-14 NOTE — H&P
[facility] Hospitalist H&P     CC: Patient presents with:  Nausea/Vomiting/Diarrhea     PCP: Neville Dawson MD    Date of Admission: 7/14/2020  2:21 PM    ASSESSMENT / PLAN:     Ms. Lico Love is a 80 yo F with PMH of TIA, HTN, DM2 who was recently diagnosed with and diarrhea on July 4th, tested for COVID in [place] on July 9th but flew back on Saturday July 11th prior to results. She was called yesterday and told it was positive. She lives with her daughter.  Daughter has been having COVID symptoms and is getting t Mother 61   • Cancer Mother         skin cancer   • Other (Other) Mother    • Cancer Maternal Grandmother         skin ca   • Heart Disorder Maternal Grandfather 46        fatal MI   • Cancer Sister         basal cell skin ca   • Diabetes Sister    • Other (cpt=71045)    Result Date: 7/14/2020  CONCLUSION:  1. There is a focal area of airspace consolidation in the right mid chest laterally measuring 4.1 x 4.4 cm more likely in the right upper lobe suggesting focal pneumonia.   Follow-up study advised to ensur

## 2020-07-14 NOTE — CONSULTS
Pulmonary H&P/Consult     NAME: Gisell Puckett - ROOM: 82 - MRN: [id number] - Age: 79year old - :  3/6/1950    Date of Admission: 2020  2:21 PM  Admission Diagnosis: No admission diagnoses are documented for this encounter.     Assessment/P 2025   • HYSTERECTOMY      partial TAMIR , large firoid and menorrhagia, ovaries remain   • IMPLANT LEFT  1988    reduced 2x last one 5 years ago   • [place]    then reduced 2x last one was 5 years ago   • OTHER  2013    breast lifts   • OTHER SETHI thrush noted. Lungs: Clear to auscultation bilaterally, no focal wheezes or crackles    Chest wall: No tenderness or deformity. Heart: Regular rate and rhythm, normal S1S2, no murmur. Abdomen: soft, non-tender, non-distended, positive BS.    Extremity

## 2020-07-14 NOTE — ED NOTES
Orders for admission, patient is aware of plan and ready to go upstairs. Any questions, please call ED RN Jasvir Johnston  at extension [phone].

## 2020-07-15 LAB
ALBUMIN SERPL-MCNC: 2.4 G/DL (ref 3.4–5)
ALBUMIN/GLOB SERPL: 0.6 {RATIO} (ref 1–2)
ALP LIVER SERPL-CCNC: 47 U/L (ref 55–142)
ALT SERPL-CCNC: 26 U/L (ref 13–56)
ANION GAP SERPL CALC-SCNC: 7 MMOL/L (ref 0–18)
AST SERPL-CCNC: 47 U/L (ref 15–37)
BASOPHILS # BLD AUTO: 0.01 X10(3) UL (ref 0–0.2)
BASOPHILS NFR BLD AUTO: 0.2 %
BILIRUB SERPL-MCNC: 0.7 MG/DL (ref 0.1–2)
BILIRUB UR QL: NEGATIVE
BUN BLD-MCNC: 21 MG/DL (ref 7–18)
BUN/CREAT SERPL: 23.3 (ref 10–20)
CALCIUM BLD-MCNC: 8.2 MG/DL (ref 8.5–10.1)
CHLORIDE SERPL-SCNC: 104 MMOL/L (ref 98–112)
CK SERPL-CCNC: 48 U/L (ref 26–192)
CO2 SERPL-SCNC: 25 MMOL/L (ref 21–32)
COLOR UR: YELLOW
CREAT BLD-MCNC: 0.9 MG/DL (ref 0.55–1.02)
CRP SERPL-MCNC: 14 MG/DL (ref ?–0.3)
D DIMER PPP FEU-MCNC: 0.87 UG/ML FEU (ref ?–0.7)
DEPRECATED HBV CORE AB SER IA-ACNC: 746.2 NG/ML (ref 18–340)
DEPRECATED RDW RBC AUTO: 42 FL (ref 35.1–46.3)
EOSINOPHIL # BLD AUTO: 0.01 X10(3) UL (ref 0–0.7)
EOSINOPHIL NFR BLD AUTO: 0.2 %
ERYTHROCYTE [DISTWIDTH] IN BLOOD BY AUTOMATED COUNT: 13.4 % (ref 11–15)
ERYTHROCYTE [SEDIMENTATION RATE] IN BLOOD: 44 MM/HR (ref 0–30)
GLOBULIN PLAS-MCNC: 3.7 G/DL (ref 2.8–4.4)
GLUCOSE BLD-MCNC: 104 MG/DL (ref 70–99)
GLUCOSE BLDC GLUCOMTR-MCNC: 123 MG/DL (ref 70–99)
GLUCOSE BLDC GLUCOMTR-MCNC: 125 MG/DL (ref 70–99)
GLUCOSE BLDC GLUCOMTR-MCNC: 132 MG/DL (ref 70–99)
GLUCOSE BLDC GLUCOMTR-MCNC: 161 MG/DL (ref 70–99)
GLUCOSE UR-MCNC: NEGATIVE MG/DL
HCT VFR BLD AUTO: 33.8 % (ref 35–48)
HGB BLD-MCNC: 12.2 G/DL (ref 12–16)
HGB UR QL STRIP.AUTO: NEGATIVE
IMM GRANULOCYTES # BLD AUTO: 0.06 X10(3) UL (ref 0–1)
IMM GRANULOCYTES NFR BLD: 1.3 %
LDH SERPL L TO P-CCNC: 361 U/L
LYMPHOCYTES # BLD AUTO: 1.33 X10(3) UL (ref 1–4)
LYMPHOCYTES NFR BLD AUTO: 28.1 %
M PROTEIN MFR SERPL ELPH: 6.1 G/DL (ref 6.4–8.2)
MCH RBC QN AUTO: 30.8 PG (ref 26–34)
MCHC RBC AUTO-ENTMCNC: 36.1 G/DL (ref 31–37)
MCV RBC AUTO: 85.4 FL (ref 80–100)
MONOCYTES # BLD AUTO: 0.21 X10(3) UL (ref 0.1–1)
MONOCYTES NFR BLD AUTO: 4.4 %
NEUTROPHILS # BLD AUTO: 3.11 X10 (3) UL (ref 1.5–7.7)
NEUTROPHILS # BLD AUTO: 3.11 X10(3) UL (ref 1.5–7.7)
NEUTROPHILS NFR BLD AUTO: 65.8 %
NITRITE UR QL STRIP.AUTO: NEGATIVE
OSMOLALITY SERPL CALC.SUM OF ELEC: 285 MOSM/KG (ref 275–295)
PH UR: 5 [PH] (ref 5–8)
PLATELET # BLD AUTO: 214 10(3)UL (ref 150–450)
POTASSIUM SERPL-SCNC: 3.7 MMOL/L (ref 3.5–5.1)
PROT UR-MCNC: NEGATIVE MG/DL
RBC # BLD AUTO: 3.96 X10(6)UL (ref 3.8–5.3)
RBC #/AREA URNS AUTO: 1 /HPF
SODIUM SERPL-SCNC: 136 MMOL/L (ref 136–145)
SP GR UR STRIP: 1.02 (ref 1–1.03)
UROBILINOGEN UR STRIP-ACNC: <2
WBC # BLD AUTO: 4.7 X10(3) UL (ref 4–11)
WBC #/AREA URNS AUTO: 14 /HPF

## 2020-07-15 PROCEDURE — 82550 ASSAY OF CK (CPK): CPT | Performed by: HOSPITALIST

## 2020-07-15 PROCEDURE — 83615 LACTATE (LD) (LDH) ENZYME: CPT | Performed by: HOSPITALIST

## 2020-07-15 PROCEDURE — 82728 ASSAY OF FERRITIN: CPT | Performed by: HOSPITALIST

## 2020-07-15 PROCEDURE — 81001 URINALYSIS AUTO W/SCOPE: CPT | Performed by: EMERGENCY MEDICINE

## 2020-07-15 PROCEDURE — 86140 C-REACTIVE PROTEIN: CPT | Performed by: HOSPITALIST

## 2020-07-15 PROCEDURE — 85652 RBC SED RATE AUTOMATED: CPT | Performed by: HOSPITALIST

## 2020-07-15 PROCEDURE — 85025 COMPLETE CBC W/AUTO DIFF WBC: CPT | Performed by: HOSPITALIST

## 2020-07-15 PROCEDURE — 87086 URINE CULTURE/COLONY COUNT: CPT | Performed by: EMERGENCY MEDICINE

## 2020-07-15 PROCEDURE — 85379 FIBRIN DEGRADATION QUANT: CPT | Performed by: HOSPITALIST

## 2020-07-15 PROCEDURE — 80053 COMPREHEN METABOLIC PANEL: CPT | Performed by: HOSPITALIST

## 2020-07-15 PROCEDURE — 82962 GLUCOSE BLOOD TEST: CPT

## 2020-07-15 RX ORDER — POTASSIUM CHLORIDE 20 MEQ/1
40 TABLET, EXTENDED RELEASE ORAL ONCE
Status: COMPLETED | OUTPATIENT
Start: 2020-07-15 | End: 2020-07-15

## 2020-07-15 NOTE — PROGRESS NOTES
Pulmonary Progress Note     Assessment / Plan:  1.  COVID19 PNA - possible bacterial superinfeciton  - symptoms started 6/30, diagnosed 7/9  - on RA  - maintain euvolemia  - PCT pending, empiric abx per ID on ceftriaxone and azithromycin  - trend inflammato

## 2020-07-15 NOTE — CONSULTS
[facility] AND [facility] Infectious Disease  Report of Consultation    Maty Flanagan Patient Status:  Inpatient    3/6/1950 MRN [id number]   Location [address] [place] [address] Attending Erik Becker MD   Hosp Day # 1 PCP Jacques Barrios MD     Date liposuction   • TONSILLECTOMY       Family History   Problem Relation Age of Onset   • Hypertension Father    • Heart Disorder Father         cad   • Lipids Father    • Hypertension Mother    • Lipids Mother    • Diabetes Mother 61   • Cancer Mother tablet, 8 tablet, Oral, Q15 Min PRN  •  Insulin Aspart Pen (NOVOLOG) 100 UNIT/ML flexpen 1-5 Units, 1-5 Units, Subcutaneous, TID CC  •  aspirin EC EC tab 325 mg, 325 mg, Oral, Daily  •  atorvastatin (LIPITOR) tab 20 mg, 20 mg, Oral, Nightly    Review of Sy rhonchi, rales, wheezes. Chest wall: No tenderness or deformity. Heart: Regular rate and rhythm, normal S1S2, no murmurs. Abdomen: Soft, NT/ND. Bowel sounds present. No organomegaly. Extremity: No edema. Skin: No rashes or lesions.    Neurologic azithromycin. Trending temps and WBCs. 95 [facility] labs. Will follow with further recommendations. D/w patient. Charline Merino  [facility] Infectious Disease  [phone]    7/15/2020  11:43 AM

## 2020-07-16 VITALS
DIASTOLIC BLOOD PRESSURE: 72 MMHG | TEMPERATURE: 98 F | SYSTOLIC BLOOD PRESSURE: 127 MMHG | OXYGEN SATURATION: 96 % | HEART RATE: 98 BPM | RESPIRATION RATE: 18 BRPM

## 2020-07-16 LAB
CRP SERPL-MCNC: 11.5 MG/DL (ref ?–0.3)
D DIMER PPP FEU-MCNC: 0.86 UG/ML FEU (ref ?–0.7)
DEPRECATED HBV CORE AB SER IA-ACNC: 730.8 NG/ML (ref 18–340)
GLUCOSE BLDC GLUCOMTR-MCNC: 125 MG/DL (ref 70–99)
GLUCOSE BLDC GLUCOMTR-MCNC: 166 MG/DL (ref 70–99)
LDH SERPL L TO P-CCNC: 336 U/L
POTASSIUM SERPL-SCNC: 4.1 MMOL/L (ref 3.5–5.1)

## 2020-07-16 PROCEDURE — 84132 ASSAY OF SERUM POTASSIUM: CPT | Performed by: HOSPITALIST

## 2020-07-16 PROCEDURE — 83615 LACTATE (LD) (LDH) ENZYME: CPT | Performed by: HOSPITALIST

## 2020-07-16 PROCEDURE — 86140 C-REACTIVE PROTEIN: CPT | Performed by: HOSPITALIST

## 2020-07-16 PROCEDURE — 85379 FIBRIN DEGRADATION QUANT: CPT | Performed by: HOSPITALIST

## 2020-07-16 PROCEDURE — 82962 GLUCOSE BLOOD TEST: CPT

## 2020-07-16 PROCEDURE — 82728 ASSAY OF FERRITIN: CPT | Performed by: HOSPITALIST

## 2020-07-16 RX ORDER — MAGNESIUM OXIDE 400 MG (241.3 MG MAGNESIUM) TABLET
3 TABLET NIGHTLY
Status: DISCONTINUED | OUTPATIENT
Start: 2020-07-16 | End: 2020-07-16

## 2020-07-16 RX ORDER — CEFADROXIL 500 MG/1
500 CAPSULE ORAL 2 TIMES DAILY
Qty: 20 CAPSULE | Refills: 0 | Status: SHIPPED | OUTPATIENT
Start: 2020-07-16 | End: 2020-07-26

## 2020-07-16 RX ORDER — AZITHROMYCIN 500 MG/1
500 TABLET, FILM COATED ORAL DAILY
Qty: 3 TABLET | Refills: 0 | Status: SHIPPED | OUTPATIENT
Start: 2020-07-16 | End: 2020-07-19

## 2020-07-16 NOTE — PROGRESS NOTES
Pulmonary Progress Note     Assessment / Plan:  1.  COVID19 PNA - possible bacterial superinfeciton  - symptoms started 6/30, diagnosed 7/9  - on RA  - maintain euvolemia  - PCT not significantly elevated, empiric abx per ID on ceftriaxone and azithromycin

## 2020-07-16 NOTE — PLAN OF CARE
Problem: Patient Centered Care  Goal: Patient preferences are identified and integrated in the patient's plan of care  Description  Interventions:  - What would you like us to know as we care for you?  I want to go home  - Provide timely, complete, and ac - FALL  Goal: Free from fall injury  Description  INTERVENTIONS:  - Assess pt frequently for physical needs  - Identify cognitive and physical deficits and behaviors that affect risk of falls. - [place] fall precautions as indicated by assessment.   - Ed including cough, deep breathe, Incentive Spirometry  - Assess the need for suctioning and perform as needed  - Assess and instruct to report SOB or any respiratory difficulty  - Respiratory Therapy support as indicated  - Manage/alleviate anxiety  - [place]

## 2020-07-16 NOTE — PROGRESS NOTES
[facility] Infectious Disease Progress Note    Sol Masha Patient Status:  Inpatient    3/6/1950 MRN [id number]   Location [facility]5W Attending Jadon Heard MD   Hosp Day # 2 PCP Royal Barbara MD     Subjective:  Pt seen fro °C), Min:97.7 °F (36.5 °C), Max:98.3 °F (36.8 °C)    Lungs: non-labored    Labs:  Lab Results   Component Value Date    K 4.1 07/16/2020    DDIMER 0.86 07/16/2020    CRP 11.50 07/16/2020     Assessment/Plan:    1.   PNA secondary to COVID  -possible seconda

## 2020-07-16 NOTE — DISCHARGE SUMMARY
General Medicine Discharge Summary     Patient ID:  Karthik Marcelino  79year old  3/6/1950    Admit date: 7/14/2020    Discharge date and time:  7/16/2020    Attending Physician: Venkata Machado MD     Consults: IP CONSULT TO HOSPITALIST  IP CONSULT TO Roman Aguilar Ms. Kristy Chicas is a 78 yo F with PMH of TIA, HTN, DM2 who was recently diagnosed with COVID who presents with shortness of breath, consistent with COVID PNA and secondary bacterial PNA, improved with ab's, O2 sats stable on RA, inflammatory markers trending d azithromycin 500 MG Tabs  Commonly known as:  ZITHROMAX  Take 1 tablet (500 mg total) by mouth daily for 3 days. Cefadroxil 500 MG Caps  Commonly known as:  DURICEF  Take 1 capsule (500 mg total) by mouth 2 (two) times daily for 10 days.         [facility] ? Those with mild symptoms, regardless of test results, are presumed to have a mild case of COVID and should stay home for 14 days from symptom onset and until they are at least 72 hours fever free. ?  Those who have tested positive for COVID or had high r Cover your mouth and nose with a tissue when you cough or sneeze.  Throw used tissues in a lined trash can; immediately wash your hands with soap and water for at least 20 seconds or clean your hands with an alcohol-based hand  that contains at krystina Seek prompt medical attention if your illness is worsening (e.g., difficulty breathing). Before seeking care, call your healthcare provider and tell them that you have, or are being evaluated for, COVID-19. Put on a facemask before you enter the facility. ? Wash your hands often with soap and water for at least 20 seconds or clean your hands with an alcohol-based hand  that contains at least 60% alcohol. ? As much as possible, stay in a specific room and away from other people in your home.  Also,

## 2020-07-16 NOTE — PLAN OF CARE
Problem: Patient Centered Care  Goal: Patient preferences are identified and integrated in the patient's plan of care  Description  Interventions:  - What would you like us to know as we care for you?   - Provide timely, complete, and accurate informatio injury  Description  INTERVENTIONS:  - Assess pt frequently for physical needs  - Identify cognitive and physical deficits and behaviors that affect risk of falls.   - [place] fall precautions as indicated by assessment.  - Educate pt/family on patient sa Spirometry  - Assess the need for suctioning and perform as needed  - Assess and instruct to report SOB or any respiratory difficulty  - Respiratory Therapy support as indicated  - Manage/alleviate anxiety  - Monitor for signs/symptoms of CO2 retention  Peg Barahona

## 2020-07-16 NOTE — PLAN OF CARE
Problem: Patient Centered Care  Goal: Patient preferences are identified and integrated in the patient's plan of care  Description  Interventions:  - What would you like us to know as we care for you? I WOULD LIKE TO GO HOME.   - Provide timely, complete, injury  Description  INTERVENTIONS:  - Assess pt frequently for physical needs  - Identify cognitive and physical deficits and behaviors that affect risk of falls.   - [place] fall precautions as indicated by assessment.  - Educate pt/family on patient sa Spirometry  - Assess the need for suctioning and perform as needed  - Assess and instruct to report SOB or any respiratory difficulty  - Respiratory Therapy support as indicated  - Manage/alleviate anxiety  - Monitor for signs/symptoms of CO2 retention  Romina Newberry

## 2020-07-16 NOTE — DIETARY NOTE
DIETITIAN NOTE:    Patient screened at no nutritional risk at admission by RN, but with decreased appetite at admission. Chart Review completed by RD due to COVID19+.   Oral nutrition supplements (ONS) initiated due to increased energy and protein needs w

## 2020-07-17 ENCOUNTER — TELEPHONE (OUTPATIENT)
Dept: MEDSURG UNIT | Facility: HOSPITAL | Age: 70
End: 2020-07-17

## 2021-03-08 DIAGNOSIS — Z23 NEED FOR VACCINATION: ICD-10-CM

## 2021-05-25 PROBLEM — U07.1 COVID-19 VIRUS DETECTED: Status: RESOLVED | Noted: 2020-07-14 | Resolved: 2021-05-25

## 2024-07-20 ENCOUNTER — APPOINTMENT (OUTPATIENT)
Dept: CT IMAGING | Facility: HOSPITAL | Age: 74
End: 2024-07-20
Payer: MEDICARE

## 2024-07-20 ENCOUNTER — HOSPITAL ENCOUNTER (INPATIENT)
Facility: HOSPITAL | Age: 74
LOS: 1 days | Discharge: HOME | End: 2024-07-22
Attending: EMERGENCY MEDICINE | Admitting: INTERNAL MEDICINE
Payer: MEDICARE

## 2024-07-20 DIAGNOSIS — R47.81 SLURRED SPEECH: Primary | ICD-10-CM

## 2024-07-20 DIAGNOSIS — Z86.73 HX OF TRANSIENT ISCHEMIC ATTACK (TIA): ICD-10-CM

## 2024-07-20 DIAGNOSIS — F10.929 ALCOHOLIC INTOXICATION WITH COMPLICATION (HCC): ICD-10-CM

## 2024-07-20 DIAGNOSIS — E11.65 TYPE 2 DIABETES MELLITUS WITH HYPERGLYCEMIA, WITH LONG-TERM CURRENT USE OF INSULIN (HCC): ICD-10-CM

## 2024-07-20 DIAGNOSIS — Z79.4 TYPE 2 DIABETES MELLITUS WITH HYPERGLYCEMIA, WITH LONG-TERM CURRENT USE OF INSULIN (HCC): ICD-10-CM

## 2024-07-20 LAB
ALBUMIN SERPL-MCNC: 4.9 G/DL (ref 3.2–4.8)
ALBUMIN/GLOB SERPL: 1.6 {RATIO} (ref 1–2)
ALP LIVER SERPL-CCNC: 107 U/L
ALT SERPL-CCNC: 14 U/L
ANION GAP SERPL CALC-SCNC: 16 MMOL/L (ref 0–18)
APTT PPP: 24.3 SECONDS (ref 23–36)
AST SERPL-CCNC: 22 U/L (ref ?–34)
BILIRUB SERPL-MCNC: 0.7 MG/DL (ref 0.2–1.1)
BUN BLD-MCNC: 13 MG/DL (ref 9–23)
BUN/CREAT SERPL: 9.3 (ref 10–20)
CALCIUM BLD-MCNC: 10 MG/DL (ref 8.7–10.4)
CHLORIDE SERPL-SCNC: 107 MMOL/L (ref 98–112)
CO2 SERPL-SCNC: 19 MMOL/L (ref 21–32)
CREAT BLD-MCNC: 1.4 MG/DL
EGFRCR SERPLBLD CKD-EPI 2021: 39 ML/MIN/1.73M2 (ref 60–?)
ETHANOL SERPL-MCNC: 207 MG/DL (ref ?–3)
GLOBULIN PLAS-MCNC: 3 G/DL (ref 2–3.5)
GLUCOSE BLD-MCNC: 315 MG/DL (ref 70–99)
GLUCOSE BLDC GLUCOMTR-MCNC: 326 MG/DL (ref 70–99)
INR BLD: 0.91 (ref 0.8–1.2)
OSMOLALITY SERPL CALC.SUM OF ELEC: 306 MOSM/KG (ref 275–295)
POTASSIUM SERPL-SCNC: 3.6 MMOL/L (ref 3.5–5.1)
PROT SERPL-MCNC: 7.9 G/DL (ref 5.7–8.2)
PROTHROMBIN TIME: 12.8 SECONDS (ref 11.6–14.8)
SODIUM SERPL-SCNC: 142 MMOL/L (ref 136–145)

## 2024-07-20 PROCEDURE — 70496 CT ANGIOGRAPHY HEAD: CPT | Performed by: EMERGENCY MEDICINE

## 2024-07-20 PROCEDURE — 70498 CT ANGIOGRAPHY NECK: CPT | Performed by: EMERGENCY MEDICINE

## 2024-07-20 PROCEDURE — 70450 CT HEAD/BRAIN W/O DYE: CPT

## 2024-07-21 ENCOUNTER — APPOINTMENT (OUTPATIENT)
Dept: MRI IMAGING | Facility: HOSPITAL | Age: 74
End: 2024-07-21
Attending: Other
Payer: MEDICARE

## 2024-07-21 PROBLEM — F10.929 ALCOHOLIC INTOXICATION WITH COMPLICATION (HCC): Status: ACTIVE | Noted: 2024-07-21

## 2024-07-21 PROBLEM — Z79.4 TYPE 2 DIABETES MELLITUS WITH HYPERGLYCEMIA, WITH LONG-TERM CURRENT USE OF INSULIN (HCC): Status: ACTIVE | Noted: 2024-07-21

## 2024-07-21 PROBLEM — K92.0 DARK EMESIS: Status: ACTIVE | Noted: 2024-07-21

## 2024-07-21 PROBLEM — E11.65 TYPE 2 DIABETES MELLITUS WITH HYPERGLYCEMIA, WITH LONG-TERM CURRENT USE OF INSULIN (HCC): Status: ACTIVE | Noted: 2024-07-21

## 2024-07-21 PROBLEM — Z86.73 HX OF TRANSIENT ISCHEMIC ATTACK (TIA): Status: ACTIVE | Noted: 2024-07-21

## 2024-07-21 LAB
ANION GAP SERPL CALC-SCNC: 12 MMOL/L (ref 0–18)
ATRIAL RATE: 92 BPM
BASOPHILS # BLD AUTO: 0.04 X10(3) UL (ref 0–0.2)
BASOPHILS NFR BLD AUTO: 0.6 %
BUN BLD-MCNC: 11 MG/DL (ref 9–23)
BUN/CREAT SERPL: 12.6 (ref 10–20)
CALCIUM BLD-MCNC: 9 MG/DL (ref 8.7–10.4)
CHLORIDE SERPL-SCNC: 110 MMOL/L (ref 98–112)
CO2 SERPL-SCNC: 20 MMOL/L (ref 21–32)
CREAT BLD-MCNC: 0.87 MG/DL
DEPRECATED RDW RBC AUTO: 43 FL (ref 35.1–46.3)
EGFRCR SERPLBLD CKD-EPI 2021: 70 ML/MIN/1.73M2 (ref 60–?)
EOSINOPHIL # BLD AUTO: 0.07 X10(3) UL (ref 0–0.7)
EOSINOPHIL NFR BLD AUTO: 1 %
ERYTHROCYTE [DISTWIDTH] IN BLOOD BY AUTOMATED COUNT: 13.3 % (ref 11–15)
EST. AVERAGE GLUCOSE BLD GHB EST-MCNC: 192 MG/DL (ref 68–126)
GLUCOSE BLD-MCNC: 213 MG/DL (ref 70–99)
GLUCOSE BLDC GLUCOMTR-MCNC: 105 MG/DL (ref 70–99)
GLUCOSE BLDC GLUCOMTR-MCNC: 112 MG/DL (ref 70–99)
GLUCOSE BLDC GLUCOMTR-MCNC: 171 MG/DL (ref 70–99)
GLUCOSE BLDC GLUCOMTR-MCNC: 175 MG/DL (ref 70–99)
GLUCOSE BLDC GLUCOMTR-MCNC: 208 MG/DL (ref 70–99)
HBA1C MFR BLD: 8.3 % (ref ?–5.7)
HCT VFR BLD AUTO: 43.3 %
HCT VFR BLD AUTO: 44.5 %
HGB BLD-MCNC: 15.2 G/DL
HGB BLD-MCNC: 15.3 G/DL
IMM GRANULOCYTES # BLD AUTO: 0.05 X10(3) UL (ref 0–1)
IMM GRANULOCYTES NFR BLD: 0.7 %
LYMPHOCYTES # BLD AUTO: 1.75 X10(3) UL (ref 1–4)
LYMPHOCYTES NFR BLD AUTO: 26.1 %
MCH RBC QN AUTO: 30.1 PG (ref 26–34)
MCHC RBC AUTO-ENTMCNC: 34.4 G/DL (ref 31–37)
MCV RBC AUTO: 87.4 FL
MONOCYTES # BLD AUTO: 0.43 X10(3) UL (ref 0.1–1)
MONOCYTES NFR BLD AUTO: 6.4 %
NEUTROPHILS # BLD AUTO: 4.37 X10 (3) UL (ref 1.5–7.7)
NEUTROPHILS # BLD AUTO: 4.37 X10(3) UL (ref 1.5–7.7)
NEUTROPHILS NFR BLD AUTO: 65.2 %
OSMOLALITY SERPL CALC.SUM OF ELEC: 300 MOSM/KG (ref 275–295)
P AXIS: 63 DEGREES
P-R INTERVAL: 156 MS
PLATELET # BLD AUTO: 204 10(3)UL (ref 150–450)
POTASSIUM SERPL-SCNC: 3.4 MMOL/L (ref 3.5–5.1)
Q-T INTERVAL: 364 MS
QRS DURATION: 84 MS
QTC CALCULATION (BEZET): 450 MS
R AXIS: 9 DEGREES
RBC # BLD AUTO: 5.09 X10(6)UL
SODIUM SERPL-SCNC: 142 MMOL/L (ref 136–145)
T AXIS: 101 DEGREES
VENTRICULAR RATE: 92 BPM
WBC # BLD AUTO: 6.7 X10(3) UL (ref 4–11)

## 2024-07-21 PROCEDURE — 99223 1ST HOSP IP/OBS HIGH 75: CPT | Performed by: OTHER

## 2024-07-21 PROCEDURE — 99223 1ST HOSP IP/OBS HIGH 75: CPT | Performed by: INTERNAL MEDICINE

## 2024-07-21 RX ORDER — MELATONIN
1000 DAILY
COMMUNITY

## 2024-07-21 RX ORDER — LORAZEPAM 1 MG/1
2 TABLET ORAL
Status: DISCONTINUED | OUTPATIENT
Start: 2024-07-21 | End: 2024-07-22

## 2024-07-21 RX ORDER — MELATONIN
3 NIGHTLY PRN
Status: DISCONTINUED | OUTPATIENT
Start: 2024-07-21 | End: 2024-07-22

## 2024-07-21 RX ORDER — DEXTROSE MONOHYDRATE 25 G/50ML
50 INJECTION, SOLUTION INTRAVENOUS
Status: DISCONTINUED | OUTPATIENT
Start: 2024-07-21 | End: 2024-07-22

## 2024-07-21 RX ORDER — NICOTINE POLACRILEX 4 MG
15 LOZENGE BUCCAL
Status: DISCONTINUED | OUTPATIENT
Start: 2024-07-21 | End: 2024-07-22

## 2024-07-21 RX ORDER — EMPAGLIFLOZIN 25 MG/1
25 TABLET, FILM COATED ORAL DAILY
COMMUNITY

## 2024-07-21 RX ORDER — ACETAMINOPHEN 325 MG/1
650 TABLET ORAL EVERY 6 HOURS PRN
Status: DISCONTINUED | OUTPATIENT
Start: 2024-07-21 | End: 2024-07-22

## 2024-07-21 RX ORDER — ATORVASTATIN CALCIUM 20 MG/1
20 TABLET, FILM COATED ORAL DAILY
Status: DISCONTINUED | OUTPATIENT
Start: 2024-07-21 | End: 2024-07-22

## 2024-07-21 RX ORDER — POTASSIUM CHLORIDE 20 MEQ/1
40 TABLET, EXTENDED RELEASE ORAL ONCE
Status: COMPLETED | OUTPATIENT
Start: 2024-07-21 | End: 2024-07-21

## 2024-07-21 RX ORDER — ONDANSETRON 2 MG/ML
4 INJECTION INTRAMUSCULAR; INTRAVENOUS EVERY 4 HOURS PRN
Status: DISCONTINUED | OUTPATIENT
Start: 2024-07-21 | End: 2024-07-22

## 2024-07-21 RX ORDER — NATEGLINIDE 60 MG/1
60 TABLET ORAL 2 TIMES DAILY WITH MEALS
COMMUNITY

## 2024-07-21 RX ORDER — LORAZEPAM 1 MG/1
1 TABLET ORAL
Status: DISCONTINUED | OUTPATIENT
Start: 2024-07-21 | End: 2024-07-22

## 2024-07-21 RX ORDER — NICOTINE POLACRILEX 4 MG
30 LOZENGE BUCCAL
Status: DISCONTINUED | OUTPATIENT
Start: 2024-07-21 | End: 2024-07-22

## 2024-07-21 RX ORDER — HYDRALAZINE HYDROCHLORIDE 20 MG/ML
10 INJECTION INTRAMUSCULAR; INTRAVENOUS EVERY 6 HOURS PRN
Status: DISCONTINUED | OUTPATIENT
Start: 2024-07-21 | End: 2024-07-22

## 2024-07-21 RX ORDER — ASPIRIN 325 MG
325 TABLET, DELAYED RELEASE (ENTERIC COATED) ORAL DAILY
Status: DISCONTINUED | OUTPATIENT
Start: 2024-07-21 | End: 2024-07-22

## 2024-07-21 RX ORDER — SODIUM CHLORIDE 9 MG/ML
INJECTION, SOLUTION INTRAVENOUS CONTINUOUS
Status: DISCONTINUED | OUTPATIENT
Start: 2024-07-21 | End: 2024-07-22

## 2024-07-21 NOTE — CONSULTS
[facility]   Gastroenterology Consultation Note    Olga Viecnte  Patient Status:    Inpatient  Date of Admission:         7/20/2024, Hospital day #0  Attending:   Megan Villalobos*  PCP:     Jenny Suazo MD    Reason for Consultation:  Dark emesis    History of Present Illness:  Olga Vicente is a a(n) 74 year old female w/ a history of TIA, HTN, ETOH, who presents with slurred speech. States she was at a dinner party yesterday evening, friends noted slurred speech and brought her to ER. She is undergoing work-up for stroke. This morning felt nauseous and had one episode of emesis which was described as dark. No melena or hematochezia. No abdominal pain. No fever or chills. She drank a chocolate martini last night. Denies chronic issues with abdominal pain, n/v, dysphagia, heartburn. No prior egd or colonoscopy. No nsaid use.     History:  Past Medical History:    Atrophic vaginitis    Other and unspecified hyperlipidemia    Perforated eardrum    Squamous cell carcinoma of skin    L upper thigh laser    TIA (transient ischemic attack)    Unspecified essential hypertension     Past Surgical History:   Procedure Laterality Date    Cholecystectomy  7/13    lap aakash    Colonoscopy  2015    repeat 2025    Hysterectomy      partial TAMIR , large firoid and menorrhagia, ovaries remain    Implant left  1988    reduced 2x last one 5 years ago    Implant right  1988    then reduced 2x last one was 5 years ago    Other  2013    breast lifts    Other surgical history  1987    breast implants,     Other surgical history  2006    revision breast implant, repeated in several months    Other surgical history      plastic surgery, neck lift, rhinoplasty, blepharoplasty    Other surgical history      liposuction    Tonsillectomy       Family History   Problem Relation Age of Onset    Hypertension Father     Heart Disorder Father         cad    Lipids Father     Hypertension Mother     Lipids  Mother     Diabetes Mother 60    Cancer Mother         skin cancer    Other (Other) Mother     Cancer Maternal Grandmother         skin ca    Heart Disorder Maternal Grandfather 51        fatal MI    Cancer Sister         basal cell skin ca    Diabetes Sister     Other (obesity) Sister     Other (glycogen storage disease) Brother     Other (glycogen storage disease) Brother     Psychiatric Sister         bipolar    Other (ETOH abuse recovering) Brother       reports that she has never smoked. She has never used smokeless tobacco. She reports current alcohol use of about 3.0 standard drinks of alcohol per week. She reports current drug use. Drug: Cannabis.    Allergies:  Allergies   Allergen Reactions    Hydrocodone HALLUCINATION    Sulfa Antibiotics ITCHING       Medications:    Current Facility-Administered Medications:     aspirin DR tab 325 mg, 325 mg, Oral, Daily    atorvastatin (Lipitor) tab 20 mg, 20 mg, Oral, Daily    glucose (Dex4) 15 GM/59ML oral liquid 15 g, 15 g, Oral, Q15 Min PRN **OR** glucose (Glutose) 40% oral gel 15 g, 15 g, Oral, Q15 Min PRN **OR** glucose-vitamin C (Dex-4) chewable tab 4 tablet, 4 tablet, Oral, Q15 Min PRN **OR** dextrose 50% injection 50 mL, 50 mL, Intravenous, Q15 Min PRN **OR** glucose (Dex4) 15 GM/59ML oral liquid 30 g, 30 g, Oral, Q15 Min PRN **OR** glucose (Glutose) 40% oral gel 30 g, 30 g, Oral, Q15 Min PRN **OR** glucose-vitamin C (Dex-4) chewable tab 8 tablet, 8 tablet, Oral, Q15 Min PRN    insulin aspart (NovoLOG) 100 Units/mL FlexPen 1-5 Units, 1-5 Units, Subcutaneous, TID CC    LORazepam (Ativan) tab 1 mg, 1 mg, Oral, Q1H PRN **OR** LORazepam (Ativan) tab 2 mg, 2 mg, Oral, Q1H PRN    sodium chloride 0.9% infusion, , Intravenous, Continuous    melatonin tab 3 mg, 3 mg, Oral, Nightly PRN    acetaminophen (Tylenol) tab 650 mg, 650 mg, Oral, Q6H PRN    ondansetron (Zofran) 4 MG/2ML injection 4 mg, 4 mg, Intravenous, Q4H PRN    pantoprazole (Protonix) 40 mg in sodium  chloride 0.9% PF 10 mL IV push, 40 mg, Intravenous, Daily    hydrALAzine (Apresoline) 20 mg/mL injection 10 mg, 10 mg, Intravenous, Q6H PRN  Medications Prior to Admission   Medication Sig Dispense Refill Last Dose    Empagliflozin (JARDIANCE) 25 MG Oral Tab Take 25 mg by mouth daily.   7/20/2024 at 0900    cyanocobalamin 1000 MCG Oral Tab Take 1 tablet (1,000 mcg total) by mouth daily.   7/20/2024 at 0900    nateglinide 60 MG Oral Tab Take 1 tablet (60 mg total) by mouth 2 (two) times daily with meals.   7/20/2024 at 1700    LOSARTAN 50 MG Oral Tab TAKE 1 TABLET(50 MG) BY MOUTH EVERY DAY 10 tablet 0 7/20/2024 at 0900    JANUVIA 100 MG Oral Tab TAKE 1 TABLET(100 MG) BY MOUTH DAILY 90 tablet 0 7/21/2024    ATORVASTATIN 20 MG Oral Tab TAKE 1 TABLET(20 MG) BY MOUTH DAILY 90 tablet 0 7/20/2024 at 0900    MILK THISTLE OR Take by mouth.       vitamin E 100 UNITS Oral Cap Take 100 Units by mouth daily.       OCUVITE-LUTEIN Oral Tab Take 1 tablet by mouth daily with breakfast.       Glucose Blood (GELY CONTOUR NEXT TEST) In Vitro Strip 1 strip by Finger stick route 2 (two) times daily. Use as directed. 100 strip 1        Review of Systems:  CONSTITUTIONAL:  negative for fevers, rigors  EYES:  negative for diplopia   RESPIRATORY:  negative for severe shortness of breath  CARDIOVASCULAR:  negative for crushing sub-sternal chest pain  GASTROINTESTINAL:  see HPI  GENITOURINARY:  negative for dysuria or gross hematuria  INTEGUMENT/BREAST:  SKIN:  negative for jaundice   ALLERGIC/IMMUNOLOGIC:  negative for hay fever  ENDOCRINE:  negative for cold intolerance and heat intolerance  MUSCULOSKELETAL:  negative for joint effusion/severe erythema  NEURO: negative for dizziness or loss of conscious or paresthesias  BEHAVIOR/PSYCH:  negative for psychotic behavior    Physical Exam:    Blood pressure 147/85, pulse 90, temperature 97.7 °F (36.5 °C), temperature source Oral, resp. rate 18, height 5' 2\" (1.575 m), weight 133 lb 6.1 oz (60.5  kg), SpO2 92%, not currently breastfeeding. Body mass index is 24.4 kg/m².    General: awake, alert and oriented, no acute distress  HEENT: moist mucus membranes  PULM: no conversational dyspnea  CARDIOVASCULAR: regular rate and rhythm, the extremities are warm and well perfused  GI: soft, non-tender, non-distended, + BS, no rebound/guarding   EXTREMITIES: no edema, moving all extremities  SKIN: no visible rash  NEURO: appropriate and interactive    Laboratory Data:  Lab Results   Component Value Date    WBC 6.7 07/21/2024    HGB 15.3 07/21/2024    HCT 44.5 07/21/2024    .0 07/21/2024    CREATSERUM 0.87 07/21/2024    BUN 11 07/21/2024     07/21/2024    K 3.4 07/21/2024     07/21/2024    CO2 20.0 07/21/2024     07/21/2024    CA 9.0 07/21/2024    ALB 4.9 07/20/2024    ALKPHO 107 07/20/2024    BILT 0.7 07/20/2024    TP 7.9 07/20/2024    AST 22 07/20/2024    ALT 14 07/20/2024    PTT 24.3 07/20/2024    INR 0.91 07/20/2024    ETOH 207 07/20/2024       Imaging:  CT STROKE CTA BRAIN/CTA NECK (W IV)(CPT=70496/97003)    Result Date: 7/21/2024  CONCLUSION:  1. No major vessel occlusion, hemodynamically significant stenosis, dissection, AVM or aneurysm. 2. Moderate narrowing proximal P2 segment of left posterior cerebral. 3. Mild atherosclerotic narrowing of internal carotids. 4. Changes of chronic small vessel disease in both cerebral hemispheres.   No major discrepancy with preliminary [facility] radiology report.   Dictated by (CST): Chiki De La Cruz MD on 7/21/2024 at 5:24 AM     Finalized by (CST): Chiki De La Cruz MD on 7/21/2024 at 5:34 AM          CT STROKE BRAIN (NO IV)(CPT=70450)    Result Date: 7/21/2024  CONCLUSION:  1. No acute infarct or hemorrhage. 2. 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 3. Chronic lacunar infarct at junction between basal ganglia and genu of left internal capsule. 4. Changes of chronic small vessel disease in cerebral white  matter. 5. Large vessel intracranial atherosclerosis. 6. No major discrepancy with preliminary [facility] radiology report.     Dictated by (CST): Chiki De La Cruz MD on 7/21/2024 at 5:19 AM     Finalized by (CST): Chiki De La Cruz MD on 7/21/2024 at 5:23 AM           Assessment & Plan   Olga Vicente is a a(n) 74 year old female w/ a history of TIA, HTN, ETOH, who presents with slurred speech undergoing stroke evaluation for which GI consulted for one episode of dark emesis. There is some question whether this was related to chocolate martini consumed last evening. No abdominal pain. No further emesis. Bowel movement this morning was brown. Hgb normal at 15.3, BUN normal. Low suspicion for acute upper GI bleed.     Recommend:  -will empirically give PPI for now  -monitor for symptom recurrence  -ok for diet from my stance  -ok for antiplatelets if needed from neuro stance  -hgb in AM    Francheska Sanford MD  [facility] Gastroenterology  7/21/2024    This note was partially prepared using Dragon Medical voice recognition dictation software. As a result, errors may occur. When identified, these errors have been corrected. While every attempt is made to correct errors during dictation, discrepancies may still exist.

## 2024-07-21 NOTE — PLAN OF CARE
Pt admitted fr ED accompanied by son Noel. Admission assessment done and medication verified with son. Dr. Villalobos aware of admission. IVFs started. Pt passed bedside swallow test. CIWA q2 continued.   Problem: Patient Centered Care  Goal: Patient preferences are identified and integrated in the patient's plan of care  Description: Interventions:  - What would you like us to know as we care for you?   - Provide timely, complete, and accurate information to patient/family  - Incorporate patient and family knowledge, values, beliefs, and cultural backgrounds into the planning and delivery of care  - Encourage patient/family to participate in care and decision-making at the level they choose  - Honor patient and family perspectives and choices  7/21/2024 0553 by Daly Niño, RN  Outcome: Progressing     Problem: SAFETY ADULT - FALL  Goal: Free from fall injury  Description: INTERVENTIONS:  - Assess pt frequently for physical needs  - Identify cognitive and physical deficits and behaviors that affect risk of falls.  - [place] fall precautions as indicated by assessment.  - Educate pt/family on patient safety including physical limitations  - Instruct pt to call for assistance with activity based on assessment  - Modify environment to reduce risk of injury  - Provide assistive devices as appropriate  - Consider OT/PT consult to assist with strengthening/mobility  - Encourage toileting schedule  7/21/2024 0553 by Daly Niño, RN  Outcome: Progressing    Problem: Patient/Family Goals  Goal: Patient/Family Short Term Goal  Description: Patient's Short Term Goal: To go home    Interventions:   - CIWA protocol, neuro work-up, neurologist consultation  - See additional Care Plan goals for specific interventions  7/21/2024 0553 by Daly Niño, RN  Outcome: Progressing  7/21/2024 0400 by Daly Niño, RN  Outcome: Progressing     Problem: NEUROLOGICAL - ADULT  Goal: Achieves stable or improved  neurological status  Description: INTERVENTIONS  - Assess for and report changes in neurological status  - Initiate measures to prevent increased intracranial pressure  - Maintain blood pressure and fluid volume within ordered parameters to optimize cerebral perfusion and minimize risk of hemorrhage  - Monitor temperature, glucose, and sodium. Initiate appropriate interventions as ordered  7/21/2024 0553 by Daly Niño RN  Outcome: Progressing     Problem: Impaired Cognition  Goal: Patient will exhibit improved attention, thought processing and/or memory  Description: Interventions:  - Allow additional time for processing after asking questions or providing instructions  7/21/2024 0553 by Daly Niño RN  Outcome: Progressing

## 2024-07-21 NOTE — ED PROVIDER NOTES
Patient Seen in: [facility] Emergency Department    History   No chief complaint on file.      HPI    74-year-old female presents to the ER for possible TIA/difficulty speaking around 7 PM.  Patient arrives to the ER at 1030.  Patient is alert and oriented but states she cannot remember where she was prior to coming to the ER.  Patient without any neurological deficits.  Patient had been drinking martinis at dinner prior to arrival.  Concern was also for elevated blood glucose and she is hyperglycemic.    History reviewed.   Past Medical History:    Atrophic vaginitis    Other and unspecified hyperlipidemia    Perforated eardrum    Squamous cell carcinoma of skin    L upper thigh laser    TIA (transient ischemic attack)    Unspecified essential hypertension       History reviewed.   Past Surgical History:   Procedure Laterality Date    Cholecystectomy  7/13    lap aakash    Colonoscopy  2015    repeat 2025    Hysterectomy      partial TAMIR , large firoid and menorrhagia, ovaries remain    Implant left  1988    reduced 2x last one 5 years ago    Implant right  1988    then reduced 2x last one was 5 years ago    Other  2013    breast lifts    Other surgical history  1987    breast implants,     Other surgical history  2006    revision breast implant, repeated in several months    Other surgical history      plastic surgery, neck lift, rhinoplasty, blepharoplasty    Other surgical history      liposuction    Tonsillectomy           Medications :  (Not in a hospital admission)       Family History   Problem Relation Age of Onset    Hypertension Father     Heart Disorder Father         cad    Lipids Father     Hypertension Mother     Lipids Mother     Diabetes Mother 60    Cancer Mother         skin cancer    Other (Other) Mother     Cancer Maternal Grandmother         skin ca    Heart Disorder Maternal Grandfather 51        fatal MI    Cancer Sister         basal cell skin ca    Diabetes Sister     Other (obesity)  Sister     Other (glycogen storage disease) Brother     Other (glycogen storage disease) Brother     Psychiatric Sister         bipolar    Other (ETOH abuse recovering) Brother        Smoking Status:   Social History     Socioeconomic History    Marital status:     Number of children: 2   Tobacco Use    Smoking status: Never    Smokeless tobacco: Never   Vaping Use    Vaping status: Never Used   Substance and Sexual Activity    Alcohol use: Yes     Alcohol/week: 3.0 standard drinks of alcohol     Types: 3 Standard drinks or equivalent per week    Drug use: Yes     Types: Cannabis     Comment: edible marijuana 1x/month   Other Topics Concern    Caffeine Concern No    Exercise Yes       ROS  All pertinent positives for the review of systems are mentioned in the HPI  All other organ systems are reviewed and are negative.    Constitutional and vital signs reviewed.      Social History and Family History elements reviewed from today, pertinent positives to the presenting problem noted.    Physical Exam     ED Triage Vitals [07/20/24 2140]   /77   Pulse 94   Resp 20   Temp 97.5 °F (36.4 °C)   Temp src Oral   SpO2 97 %   O2 Device None (Room air)       All measures to prevent infection transmission during my interaction with the patient were taken. The patient was already wearing a droplet mask on my arrival to the room. Personal protective equipment including droplet mask, eye protection, and gloves were worn throughout the duration of the exam.  Handwashing was performed prior to and after the exam.  Stethoscope and any equipment used during my examination was cleaned with super sani-cloth germicidal wipes following the exam.     Physical Exam  Vitals and nursing note reviewed.   Constitutional:       Appearance: She is well-developed.   HENT:      Head: Normocephalic and atraumatic.      Right Ear: External ear normal.      Left Ear: External ear normal.      Nose: Nose normal.   Eyes:       Conjunctiva/sclera: Conjunctivae normal.      Pupils: Pupils are equal, round, and reactive to light.   Cardiovascular:      Rate and Rhythm: Normal rate and regular rhythm.      Heart sounds: Normal heart sounds.   Pulmonary:      Effort: Pulmonary effort is normal.      Breath sounds: Normal breath sounds.   Abdominal:      General: Bowel sounds are normal.      Palpations: Abdomen is soft.   Musculoskeletal:         General: Normal range of motion.      Cervical back: Normal range of motion and neck supple.   Skin:     General: Skin is warm and dry.   Neurological:      General: No focal deficit present.      Mental Status: She is alert. She is disoriented.      Cranial Nerves: No cranial nerve deficit, dysarthria or facial asymmetry.      Sensory: Sensation is intact. No sensory deficit.      Motor: No weakness or tremor.      Coordination: Coordination normal.      Deep Tendon Reflexes: Reflexes are normal and symmetric.   Psychiatric:         Behavior: Behavior normal.         Thought Content: Thought content normal.         Judgment: Judgment normal.         ED Course        Labs Reviewed   COMP METABOLIC PANEL (14) - Abnormal; Notable for the following components:       Result Value    Glucose 315 (*)     CO2 19.0 (*)     Creatinine 1.40 (*)     BUN/CREA Ratio 9.3 (*)     Calculated Osmolality 306 (*)     eGFR-Cr 39 (*)     Albumin 4.9 (*)     All other components within normal limits   ETHYL ALCOHOL - Abnormal; Notable for the following components:    Ethyl Alcohol 207 (*)     All other components within normal limits   POCT GLUCOSE - Abnormal; Notable for the following components:    POC Glucose  326 (*)     All other components within normal limits   CBC W/ DIFFERENTIAL - Abnormal; Notable for the following components:    RBC 5.91 (*)     HGB 18.2 (*)     HCT 50.4 (*)     All other components within normal limits   PROTHROMBIN TIME (PT) - Normal   PTT, ACTIVATED - Normal   CBC WITH DIFFERENTIAL WITH  PLATELET    Narrative:     The following orders were created for panel order CBC With Differential With Platelet.                  Procedure                               Abnormality         Status                                     ---------                               -----------         ------                                     CBC W/ DIFFERENTIAL[[id number]]          Abnormal            Preliminary result                                           Please view results for these tests on the individual orders.   MD BLOOD SMEAR CONSULT   RAINBOW DRAW LAVENDER   RAINBOW DRAW LIGHT GREEN   RAINBOW DRAW BLUE     EKG    Rate, intervals and axes as noted on EKG Report.  Rate: 92  Rhythm: Sinus Rhythm  Reading: ST deviation, normal axis.             Imaging Results Available and Reviewed while in ED: No results found.  ED Medications Administered:   Medications   sodium chloride 0.9 % IV bolus 1,000 mL (1,000 mL Intravenous New Bag 7/20/24 2249)   iopamidol 76% (ISOVUE-370) injection for power injector (70 mL Intravenous Given 7/20/24 2203)         MDM     Vitals:    07/20/24 2140 07/20/24 2147 07/20/24 2245   BP: 144/77     Pulse: 94  86   Resp: 20  13   Temp: 97.5 °F (36.4 °C)     TempSrc: Oral     SpO2: 97%  97%   Weight:  58.4 kg      *I personally reviewed and interpreted all ED vitals.  I also personally reviewed all labs and imaging if ordered    Pulse Ox: 97%, Room air, Normal     Monitor Interpretation:   normal sinus rhythm    Differential Diagnosis/ Diagnostic Considerations: TIA, CVA, hypoglycemia, EtOH,    Medical Record Review: I personally reviewed available prior medical records for any recent pertinent discharge summaries, testing, and procedures and reviewed those reports.    Complicating Factors: The patient already has does not have any pertinent problems on file. to contribute to the complexity of this ED evaluation.    Medical Decision Making  74-year-old presents ER with dysarthria.  Patient CT  brain and CT angio head and neck negative for any acute intracranial process.  Patient with elevated blood glucose of 315.  1 L of IV fluids given.  Patient alcohol level 204.  Patient's slurring of her speech could be secondary to TIA versus alcohol intoxication versus hypoglycemia.  Discussed with neurology who states that they will reevaluate the patient in the morning.  Patient's NIH score is 1 and does not require intervention at this time.  Primary care physician notified of the admission.  Patient's family was bedside made aware of the disposition admission    Total Critical Care Time: 35 minutes including time spent examining and re-evaluating the patient, ordering and reviewing laboratory tests, documenting, reviewing previous records, obtaining information from the family, and speaking with consultants, admitting doctors, nurses and medics and excludes any time spent on procedures.      Problems Addressed:  Alcoholic intoxication with complication (HCC): acute illness or injury  Hx of transient ischemic attack (TIA): chronic illness or injury  Slurred speech: acute illness or injury  Type 2 diabetes mellitus with hyperglycemia, with long-term current use of insulin (HCC): acute illness or injury    Amount and/or Complexity of Data Reviewed  Independent Historian:      Details: History of obtained from family members were bedside.  States the patient had 2-3 martinis at dinner.  Patient also had elevated blood glucose at dinner.  Patient's speech is now back to baseline in the ER according to family  Labs: ordered. Decision-making details documented in ED Course.  Radiology: ordered and independent interpretation performed. Decision-making details documented in ED Course.     Details: CT brain interpreted by myself shows no acute intracranial process.        Condition upon leaving the department: Stable    Disposition and Plan     Clinical Impression:  1. Slurred speech    2. Alcoholic intoxication with  complication (HCC)    3. Type 2 diabetes mellitus with hyperglycemia, with long-term current use of insulin (HCC)    4. Hx of transient ischemic attack (TIA)        Disposition:  Admit    Follow-up:  No follow-up provider specified.    Medications Prescribed:  Current Discharge Medication List          Hospital Problems       Present on Admission  Date Reviewed: 5/27/2021            ICD-10-CM Noted POA    * (Principal) Slurred speech R47.81 7/20/2024 Unknown

## 2024-07-21 NOTE — CONSULTS
[facility]  [address] [place] [address], [address]  [facility] [address]  [phone]            Olga Vicente Patient Status:  Inpatient    3/6/1950 MRN [id number]   Location [facility] 3W/SW Attending Megan Villalobos*   Hosp Day # 0 PCP Jenny Suazo MD     Date of Admission:  2024  Date of Consult:  2024  Reason for Consultation:   Acute stroke  History of Present Illness:   Patient is a 74 year old female who was admitted to the hospital for Slurred speech:  History was obtained from the patient herself as well as from the medical record.  She has been having emesis as I walked in the room so history was limited.  She started having speech slurring around 7 PM and arrived to the emergency department around 10:30 PM.  She could not tell exactly why she was in the emergency department and apparently she has been drinking alcohol prior to her coming to the emergency she denies any weakness or numbness in her upper or lower extremities.  NIHSS was 1 for the emergency physician mainly due to minimal dysarthria. She was not a candidate for thrombolysis as she only had minimal dysarthria without drift or weakness in her upper or lower extremities or any facial droop.  Today she is telling me that her speech is back to normal.   No falls, no seizures or seizure-like activity.  She had a CT scan without contrast upon arrival to the emergency department that did not reveal any acute intracranial abnormalities  She also had a CT angiogram of the head and neck that revealed moderate narrowing of the proximal P2 segment of left posterior cerebral artery.  There were no major vessel occlusion or hemodynamically significant stenosis otherwise.   Denies fever, chills, headaches or neck stiffness for me    Past Medical History  Past Medical History:    Atrophic vaginitis    Other and unspecified hyperlipidemia    Perforated eardrum    Squamous cell carcinoma of skin     L upper thigh laser    TIA (transient ischemic attack)    Unspecified essential hypertension       Past Surgical History  Past Surgical History:   Procedure Laterality Date    Cholecystectomy  7/13    lap aakash    Colonoscopy  2015    repeat 2025    Hysterectomy      partial TAMIR , large firoid and menorrhagia, ovaries remain    Implant left  1988    reduced 2x last one 5 years ago    Implant right  1988    then reduced 2x last one was 5 years ago    Other  2013    breast lifts    Other surgical history  1987    breast implants,     Other surgical history  2006    revision breast implant, repeated in several months    Other surgical history      plastic surgery, neck lift, rhinoplasty, blepharoplasty    Other surgical history      liposuction    Tonsillectomy         Family History  Family History   Problem Relation Age of Onset    Hypertension Father     Heart Disorder Father         cad    Lipids Father     Hypertension Mother     Lipids Mother     Diabetes Mother 60    Cancer Mother         skin cancer    Other (Other) Mother     Cancer Maternal Grandmother         skin ca    Heart Disorder Maternal Grandfather 51        fatal MI    Cancer Sister         basal cell skin ca    Diabetes Sister     Other (obesity) Sister     Other (glycogen storage disease) Brother     Other (glycogen storage disease) Brother     Psychiatric Sister         bipolar    Other (ETOH abuse recovering) Brother        Social History  Pediatric History   Patient Parents    Not on file     Other Topics Concern     Service Not Asked    Blood Transfusions Not Asked    Caffeine Concern No    Occupational Exposure Not Asked    Hobby Hazards Not Asked    Sleep Concern Not Asked    Stress Concern Not Asked    Weight Concern Not Asked    Special Diet Not Asked    Back Care Not Asked    Exercise Yes    Bike Helmet Not Asked    Seat Belt Not Asked    Self-Exams Not Asked   Social History Narrative    Not on file           Current  Medications:  Current Facility-Administered Medications   Medication Dose Route Frequency    aspirin DR tab 325 mg  325 mg Oral Daily    atorvastatin (Lipitor) tab 20 mg  20 mg Oral Daily    glucose (Dex4) 15 GM/59ML oral liquid 15 g  15 g Oral Q15 Min PRN    Or    glucose (Glutose) 40% oral gel 15 g  15 g Oral Q15 Min PRN    Or    glucose-vitamin C (Dex-4) chewable tab 4 tablet  4 tablet Oral Q15 Min PRN    Or    dextrose 50% injection 50 mL  50 mL Intravenous Q15 Min PRN    Or    glucose (Dex4) 15 GM/59ML oral liquid 30 g  30 g Oral Q15 Min PRN    Or    glucose (Glutose) 40% oral gel 30 g  30 g Oral Q15 Min PRN    Or    glucose-vitamin C (Dex-4) chewable tab 8 tablet  8 tablet Oral Q15 Min PRN    insulin aspart (NovoLOG) 100 Units/mL FlexPen 1-5 Units  1-5 Units Subcutaneous TID CC    LORazepam (Ativan) tab 1 mg  1 mg Oral Q1H PRN    Or    LORazepam (Ativan) tab 2 mg  2 mg Oral Q1H PRN    sodium chloride 0.9% infusion   Intravenous Continuous    melatonin tab 3 mg  3 mg Oral Nightly PRN    acetaminophen (Tylenol) tab 650 mg  650 mg Oral Q6H PRN    ondansetron (Zofran) 4 MG/2ML injection 4 mg  4 mg Intravenous Q4H PRN    pantoprazole (Protonix) 40 mg in sodium chloride 0.9% PF 10 mL IV push  40 mg Intravenous Daily    hydrALAzine (Apresoline) 20 mg/mL injection 10 mg  10 mg Intravenous Q6H PRN     Medications Prior to Admission   Medication Sig    Empagliflozin (JARDIANCE) 25 MG Oral Tab Take 25 mg by mouth daily.    cyanocobalamin 1000 MCG Oral Tab Take 1 tablet (1,000 mcg total) by mouth daily.    nateglinide 60 MG Oral Tab Take 1 tablet (60 mg total) by mouth 2 (two) times daily with meals.    LOSARTAN 50 MG Oral Tab TAKE 1 TABLET(50 MG) BY MOUTH EVERY DAY    JANUVIA 100 MG Oral Tab TAKE 1 TABLET(100 MG) BY MOUTH DAILY    ATORVASTATIN 20 MG Oral Tab TAKE 1 TABLET(20 MG) BY MOUTH DAILY    MILK THISTLE OR Take by mouth.    vitamin E 100 UNITS Oral Cap Take 100 Units by mouth daily.    OCUVITE-LUTEIN Oral Tab Take  1 tablet by mouth daily with breakfast.    Glucose Blood (GELY CONTOUR NEXT TEST) In Vitro Strip 1 strip by Finger stick route 2 (two) times daily. Use as directed.       Allergies  Allergies   Allergen Reactions    Hydrocodone HALLUCINATION    Sulfa Antibiotics ITCHING       Review of Systems:   As in HPI, the rest of the 14 system review was done and was negative    Physical Exam:     Vitals:    07/21/24 0515 07/21/24 0600 07/21/24 0800 07/21/24 1000   BP: (!) 166/94  (!) 180/96 147/85   Pulse: 98 93 90 90   Resp: 19  18    Temp: 98.1 °F (36.7 °C)  97.7 °F (36.5 °C)    TempSrc: Oral  Oral    SpO2: 90%  92%    Weight:       Height:           General: No apparent distress, well nourished, well groomed.  Head- Normocephalic, atraumatic  Eyes- No redness or swelling  ENT- Hearing intake, smell preserved, normal glutition  Neck- No masses or adenopathy  Cv: pulses were palpable and normal, no cyanosis or edema     Neurological:     Mental Status- Alert and oriented x3.  Normal attention span and concentration  Thought process intact  Memory intact- recent and remote  Mood intact  Fund of knowledge appropriate for education and age    Language intact including: comprehension, naming, repetition, vocabulary  Normal speech    Cranial Nerves:  II.- Visual fields full to confrontation  III, IV, VI- EOM intact, BRUNA  V. Facial sensation intact  VII. Face symmetric, no facial weakness  VIII. Hearing intact.  IX. Pallet elevates symmetrically.  XI. Shoulder shrug is intact  XII. Tongue is midline    Motor Exam:  Muscle tone normal  No atrophy or fasciculations  Strength- upper extremities 5/5 proximally and distally                  - lower  extremities 5/5 proximally and distally    Sensory Exam:  Light touch sensation- intact in all 4 extremities    Deep Tendon Reflexes:  Biceps 2+ bilateral symmetric  Triceps 2+ bilateral symmetric  Brachioradialis 2 + bilateral symmetric  Patellar 2+ bilateral symmetric  Ankle jerk 2+  bilateral symmetric    No clonus  No Babinski sign    Coordination:  Finger to nose intact  Rapid alternating movements intact      Results:     Laboratory Data:  Lab Results   Component Value Date    WBC 6.7 07/21/2024    HGB 15.3 07/21/2024    HCT 44.5 07/21/2024    .0 07/21/2024    CREATSERUM 0.87 07/21/2024    BUN 11 07/21/2024     07/21/2024    K 3.4 (L) 07/21/2024     07/21/2024    CO2 20.0 (L) 07/21/2024     (H) 07/21/2024    CA 9.0 07/21/2024    ALB 4.9 (H) 07/20/2024    ALKPHO 107 07/20/2024    TP 7.9 07/20/2024    AST 22 07/20/2024    ALT 14 07/20/2024    PTT 24.3 07/20/2024    INR 0.91 07/20/2024    PTP 12.8 07/20/2024    TSH 1.170 05/25/2021    DDIMER 0.86 (H) 07/16/2020    ESRML 44 (H) 07/15/2020    CRP 11.50 (H) 07/16/2020    TROP <0.045 07/14/2020    CK 48 07/15/2020    ETOH 207 (H) 07/20/2024         Imaging:    CT STROKE CTA BRAIN/CTA NECK (W IV)(CPT=70496/76929)    Result Date: 7/21/2024  CONCLUSION:  1. No major vessel occlusion, hemodynamically significant stenosis, dissection, AVM or aneurysm. 2. Moderate narrowing proximal P2 segment of left posterior cerebral. 3. Mild atherosclerotic narrowing of internal carotids. 4. Changes of chronic small vessel disease in both cerebral hemispheres.   No major discrepancy with preliminary [facility] radiology report.   Dictated by (CST): Chiki De La Cruz MD on 7/21/2024 at 5:24 AM     Finalized by (CST): Chiki De La Cruz MD on 7/21/2024 at 5:34 AM          CT STROKE BRAIN (NO IV)(CPT=70450)    Result Date: 7/21/2024  CONCLUSION:  1. No acute infarct or hemorrhage. 2. 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 3. Chronic lacunar infarct at junction between basal ganglia and genu of left internal capsule. 4. Changes of chronic small vessel disease in cerebral white matter. 5. Large vessel intracranial atherosclerosis. 6. No major discrepancy with preliminary [facility] radiology report.     Dictated  by (CST): Chiki De La Cruz MD on 7/21/2024 at 5:19 AM     Finalized by (CST): Chiki De La Cruz MD on 7/21/2024 at 5:23 AM         EKG 12 Lead    Result Date: 7/21/2024  Normal sinus rhythm Nonspecific T wave abnormality Abnormal ECG No previous ECGs found in [facility] Confirmed by DEANDRE ALDANA PRATIK ([id number]) on 7/21/2024 11:00:19 AM       Impression:     Slurred speech    Alcoholic intoxication with complication (HCC)    Unlikely a vascular event as the only deficit was minimal dysarthria.  Today on my exam she had no dysarthria.  She has been drinking alcohol yesterday.  CT scan of the head without contrast without acute intracranial abnormalities and CT angiogram with minimal to moderate stenosis of the right P2 segment.  She is on aspirin 325 mg daily and atorvastatin 40 mg daily.        Recommendations:  1- MRI brain without contrast    Thank you for allowing me to participate in the care of your patient.    Giuliana Alejandre MD

## 2024-07-21 NOTE — ED QUICK NOTES
Orders for admission, patient is aware of plan and ready to go upstairs. Any questions, please call ED RN Sherin at extension [phone].     Patient Covid vaccination status: Unvaccinated     COVID Test Ordered in ED: None    COVID Suspicion at Admission: N/A    Running Infusions:      Mental Status/LOC at time of transport: A/Ox4    Other pertinent information:   CIWA score: N/A   NIH score:  1

## 2024-07-21 NOTE — SLP NOTE
ADULT SWALLOWING EVALUATION    ASSESSMENT    ASSESSMENT/OVERALL IMPRESSION:  PPE REQUIRED. THIS THERAPIST WORE GLOVES, DROPLET MASK, AND GOGGLES FOR DURATION OF EVALUATION. HANDS WASHED UPON ENTRANCE/EXIT.    SLP BSSE orders received and acknowledged. A swallow evaluation warranted secondary to stroke protocol. Pt afebrile with clear vocal quality, on room air, with oxygen saturation at 97. Pt with prior hx of dysphagia at [facility] in which last recommended diet was regular/thin per BSSE in 2017. Pt positioned upright in bed. Pt with complaints of head pain, RN aware. Pt with adequate oral acceptance and bilabial seal across all trials. Pt with intact bite, mastication of solids, and timely A-P transit. Pt's swallow response appears timely with adequate hyolaryngeal elevation/excursion. No clinical signs of aspiration (e.g., immediate/delayed throat clear, immediate/delayed cough, wet vocal quality, increased O2 effort) observed across all trials. Oxygen status remained >95 t/o the entire evaluation.     At this time, pt presents with adequate oral and pharyngeal phase for safe oral intake. Recommend a regular diet and thin liquids with strict adherence to safe swallowing compensatory strategies. Results and recommendations reviewed with RN and pt. Pt v/u to all results/recommendations. Recommendations remain written on whiteboard. SLP collaborated with RN for MD diet orders.     PLAN: SLP to sign off at this time secondary to pt tolerating least restrictive diet with no CSA. Pt reports no changes with cognitive-communication abilities.     RECOMMENDATIONS   Diet Recommendations - Solids: Regular  Diet Recommendations - Liquids: Thin Liquids     Aspiration Precautions: Upright position  Medication Administration Recommendations: No restrictions  Treatment Plan/Recommendations: No further inpatient SLP service warranted    HISTORY   MEDICAL HISTORY  Reason for Referral: Stroke protocol    Problem List  Principal  Problem:    Slurred speech  Active Problems:    Alcoholic intoxication with complication (HCC)    Type 2 diabetes mellitus with hyperglycemia, with long-term current use of insulin (HCC)    Hx of transient ischemic attack (TIA)      Past Medical History  Past Medical History:    Atrophic vaginitis    Other and unspecified hyperlipidemia    Perforated eardrum    Squamous cell carcinoma of skin    L upper thigh laser    TIA (transient ischemic attack)    Unspecified essential hypertension       Prior Living Situation: Home with support  Diet Prior to Admission: Regular;Thin liquids  Precautions: Aspiration    Patient/Family Goals: No difficulties swallowing    SWALLOWING HISTORY  Current Diet Consistency: Regular;Thin liquids  Dysphagia History:   BSE 10/9/24: regular/thin      Imaging Results:     CT BRAIN 7/20/24:  CONCLUSION:   1. No acute infarct or hemorrhage.   2. 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   3. Chronic lacunar infarct at junction between basal ganglia and genu of left internal capsule.   4. Changes of chronic small vessel disease in cerebral white matter.   5. Large vessel intracranial atherosclerosis.   6. No major discrepancy with preliminary [facility] radiology report.               Dictated by (CST): Chiki De La Cruz MD on 7/21/2024 at 5:19 AM       Finalized by (CST): Chiki De La Cruz MD on 7/21/2024 at 5:23 AM     CTA BRAIN 7/20/24:  CONCLUSION:   1. No major vessel occlusion, hemodynamically significant stenosis, dissection, AVM or aneurysm.   2. Moderate narrowing proximal P2 segment of left posterior cerebral.   3. Mild atherosclerotic narrowing of internal carotids.   4. Changes of chronic small vessel disease in both cerebral hemispheres.         No major discrepancy with preliminary [facility] radiology report.         Dictated by (CST): Chiki De La Cruz MD on 7/21/2024 at 5:24 AM       Finalized by (CST): Chiki De La Cruz MD on 7/21/2024 at 5:34 AM      OBJECTIVE   ORAL MOTOR EXAMINATION  Dentition: Natural;Functional  Symmetry: Within Functional Limits  Strength: Within Functional Limits     Range of Motion: Within Functional Limits  Rate of Motion: Within Functional Limits    Voice Quality: Clear  Respiratory Status: Unlabored  Consistencies Trialed: Thin liquids;Hard solid  Method of Presentation: Self presentation;Cup;Straw;Single sips  Patient Positioning: Upright;Midline    Oral Phase of Swallow: Within Functional Limits    Pharyngeal Phase of Swallow: Within Functional Limits           (Please note: Silent aspiration cannot be evaluated clinically. Videofluoroscopic Swallow Study is required to rule-out silent aspiration.)    Esophageal Phase of Swallow: No complaints consistent with possible esophageal involvement    FOLLOW UP  Treatment Plan/Recommendations: No further inpatient SLP service warranted  Number of Visits to Meet Established Goals: 0  Follow Up Needed (Documentation Required): No       Thank you for your referral.   If you have any questions, please contact MICHELLE Pitt M.S. CCC-SLP  Speech Language Pathologist  Phone Number [phone]

## 2024-07-21 NOTE — H&P
[facility]  part of [facility]    History & Physical    Olga Vicente Patient Status:  Inpatient    3/6/1950 MRN [id number]   Location [facility] 3W/SW Attending Megan Villalobos*   Hosp Day # 0 PCP Jenny Suazo MD     Date:  2024  Date of Admission:  2024    History provided by:patient  Chief Complaint:     Slurred speech  HPI:   Olga Vicente is a(n) 74 year old female with a history of type 2 diabetes mellitus, bipolar disorder, history of alcohol use loss brought to emergency room for evaluation of slurred speech at around 7 PM.  Patient came into the ER at 10:30 PM when she was awake alert and oriented but cannot remember where she was prior to coming to ER.  Patient states that she has been drinking martini at dinner prior to arrival.  In the ER initial workup was done for stroke which was negative.  Serum alcohol level was 204.  CT brain and CT angio head and neck was negative for acute intracranial process.  So she was admitted for further evaluation and treatment.  Neurology consulted.  Today morning she had an episode of coffee-ground emesis so GI was also consulted    History     Past Medical History:    Atrophic vaginitis    Other and unspecified hyperlipidemia    Perforated eardrum    Squamous cell carcinoma of skin    L upper thigh laser    TIA (transient ischemic attack)    Unspecified essential hypertension     Past Surgical History:   Procedure Laterality Date    Cholecystectomy      lap aakash    Colonoscopy  2015    repeat     Hysterectomy      partial TAMIR , large firoid and menorrhagia, ovaries remain    Implant left      reduced 2x last one 5 years ago    Implant right      then reduced 2x last one was 5 years ago    Other  2013    breast lifts    Other surgical history      breast implants,     Other surgical history      revision breast implant, repeated in several months    Other surgical history       plastic surgery, neck lift, rhinoplasty, blepharoplasty    Other surgical history      liposuction    Tonsillectomy       Family History   Problem Relation Age of Onset    Hypertension Father     Heart Disorder Father         cad    Lipids Father     Hypertension Mother     Lipids Mother     Diabetes Mother 60    Cancer Mother         skin cancer    Other (Other) Mother     Cancer Maternal Grandmother         skin ca    Heart Disorder Maternal Grandfather 51        fatal MI    Cancer Sister         basal cell skin ca    Diabetes Sister     Other (obesity) Sister     Other (glycogen storage disease) Brother     Other (glycogen storage disease) Brother     Psychiatric Sister         bipolar    Other (ETOH abuse recovering) Brother      Social History:  Social History     Socioeconomic History    Marital status:     Number of children: 2   Tobacco Use    Smoking status: Never    Smokeless tobacco: Never   Vaping Use    Vaping status: Never Used   Substance and Sexual Activity    Alcohol use: Yes     Alcohol/week: 3.0 standard drinks of alcohol     Types: 3 Standard drinks or equivalent per week    Drug use: Yes     Types: Cannabis     Comment: edible marijuana 1x/month   Other Topics Concern    Caffeine Concern No    Exercise Yes     Social Determinants of Health     Food Insecurity: No Food Insecurity (7/21/2024)    Food Insecurity     Food Insecurity: Never true   Transportation Needs: No Transportation Needs (7/21/2024)    Transportation Needs     Lack of Transportation: No   Housing Stability: Low Risk  (7/21/2024)    Housing Stability     Housing Instability: No     Allergies/Medications:   Allergies:   Allergies   Allergen Reactions    Hydrocodone HALLUCINATION    Sulfa Antibiotics ITCHING     Medications Prior to Admission   Medication Sig    Empagliflozin (JARDIANCE) 25 MG Oral Tab Take 25 mg by mouth daily.    cyanocobalamin 1000 MCG Oral Tab Take 1 tablet (1,000 mcg total) by mouth daily.     nateglinide 60 MG Oral Tab Take 1 tablet (60 mg total) by mouth 2 (two) times daily with meals.    LOSARTAN 50 MG Oral Tab TAKE 1 TABLET(50 MG) BY MOUTH EVERY DAY    JANUVIA 100 MG Oral Tab TAKE 1 TABLET(100 MG) BY MOUTH DAILY    ATORVASTATIN 20 MG Oral Tab TAKE 1 TABLET(20 MG) BY MOUTH DAILY    MILK THISTLE OR Take by mouth.    vitamin E 100 UNITS Oral Cap Take 100 Units by mouth daily.    OCUVITE-LUTEIN Oral Tab Take 1 tablet by mouth daily with breakfast.    Glucose Blood (GELY CONTOUR NEXT TEST) In Vitro Strip 1 strip by Finger stick route 2 (two) times daily. Use as directed.       Review of Systems:   Constitutional: negative except for fatigue  Eyes: negative   Ears, nose, mouth, throat, and face: negative e  Respiratory: negative   Cardiovascular: negative   Gastrointestinal: negative except for vomiting and coffee-ground emesis  Musculoskeletal:negative   Neurological: See HPI     Physical Exam:   Vital Signs:  Blood pressure 147/85, pulse 90, temperature 97.7 °F (36.5 °C), temperature source Oral, resp. rate 18, height 5' 2\" (1.575 m), weight 133 lb 6.1 oz (60.5 kg), SpO2 92%, not currently breastfeeding.     General appearance: alert and cooperative  Head: Normocephalic,atraumatic PERRL, EOM's intact.  Neck: no JVD and supple  Pulmonary:  clear to auscultation bilaterally  Cardiovascular: S1, S2 normal regular rhythm  Abdominal: soft, non-tender; mild tenderness in the epigastrium no guarding or rigidity bowel sounds normal  Extremities: No edema   Neurologic: Alert and oriented X 3,   No focal neurologic deficits    Cervical Papanicolaou to be done in MD's office    Results:     Lab Results   Component Value Date    WBC 6.7 07/21/2024    HGB 15.3 07/21/2024    HCT 44.5 07/21/2024    .0 07/21/2024    CREATSERUM 0.87 07/21/2024    BUN 11 07/21/2024     07/21/2024    K 3.4 (L) 07/21/2024     07/21/2024    CO2 20.0 (L) 07/21/2024     (H) 07/21/2024    CA 9.0 07/21/2024    ALB 4.9  (H) 07/20/2024    ALKPHO 107 07/20/2024    BILT 0.7 07/20/2024    TP 7.9 07/20/2024    AST 22 07/20/2024    ALT 14 07/20/2024    PTT 24.3 07/20/2024    INR 0.91 07/20/2024    TSH 1.170 05/25/2021    DDIMER 0.86 (H) 07/16/2020    ESRML 44 (H) 07/15/2020    CRP 11.50 (H) 07/16/2020    TROP <0.045 07/14/2020    CK 48 07/15/2020    ETOH 207 (H) 07/20/2024       CT STROKE CTA BRAIN/CTA NECK (W IV)(CPT=70496/57457)    Result Date: 7/21/2024  CONCLUSION:  1. No major vessel occlusion, hemodynamically significant stenosis, dissection, AVM or aneurysm. 2. Moderate narrowing proximal P2 segment of left posterior cerebral. 3. Mild atherosclerotic narrowing of internal carotids. 4. Changes of chronic small vessel disease in both cerebral hemispheres.   No major discrepancy with preliminary [facility] radiology report.   Dictated by (CST): Chiki De La Cruz MD on 7/21/2024 at 5:24 AM     Finalized by (CST): Chiki De La Cruz MD on 7/21/2024 at 5:34 AM          CT STROKE BRAIN (NO IV)(CPT=70450)    Result Date: 7/21/2024  CONCLUSION:  1. No acute infarct or hemorrhage. 2. 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 3. Chronic lacunar infarct at junction between basal ganglia and genu of left internal capsule. 4. Changes of chronic small vessel disease in cerebral white matter. 5. Large vessel intracranial atherosclerosis. 6. No major discrepancy with preliminary [facility] radiology report.     Dictated by (CST): Chiki De La Cruz MD on 7/21/2024 at 5:19 AM     Finalized by (CST): Chiki De La Cruz MD on 7/21/2024 at 5:23 AM         EKG 12 Lead    Result Date: 7/21/2024  Normal sinus rhythm Nonspecific T wave abnormality Abnormal ECG No previous ECGs found in [facility]     Assessment/Plan:     Slurred speech  Most likely due to alcohol intoxication since patient did not had any other neurologic deficits  Speech is normal today  CT brain negative   CT angio head and neck negative      Alcoholic intoxication  with complication (HCC)  Serum alcohol level 204    Coffee-ground emesis  Could be due to alcoholic gastritis/Kelly-Guzman tear  IV Protonix  Keep n.p.o., IV fluid  Check H&H stat  GI consult      Type 2 diabetes mellitus with hyperglycemia, with long-term current use of insulin (HCC)  Sliding scale insulin      Hx of transient ischemic attack (TIA)  Patient was on aspirin and statin daily    DVT prophylaxis  SCD      Certification      PHYSICIAN Certification of Need for Inpatient Hospitalization - Initial Certification    Patient will require inpatient services that will reasonably be expected to span two midnight's based on the clinical documentation in H+P.   Based on patients current state of illness, I anticipate that, after discharge, patient will require TBD.       ABDIAS BEASLEY MD  7/21/2024

## 2024-07-21 NOTE — ED INITIAL ASSESSMENT (HPI)
Family states they noticed speech has changed since 1900 today, however endorse friends mentioned changes over the past week. AxOx3 - unsure of time. Normal sensation and strength noted to all extremities. Hx of TIA and Stroke.

## 2024-07-22 VITALS
RESPIRATION RATE: 18 BRPM | SYSTOLIC BLOOD PRESSURE: 147 MMHG | WEIGHT: 130.5 LBS | DIASTOLIC BLOOD PRESSURE: 81 MMHG | HEART RATE: 73 BPM | HEIGHT: 62 IN | BODY MASS INDEX: 24.01 KG/M2 | OXYGEN SATURATION: 99 % | TEMPERATURE: 98 F

## 2024-07-22 LAB
BASOPHILS # BLD AUTO: 0.05 X10(3) UL (ref 0–0.2)
BASOPHILS NFR BLD AUTO: 0.7 %
BILIRUB UR QL: NEGATIVE
CLARITY UR: CLEAR
DEPRECATED RDW RBC AUTO: 41.6 FL (ref 35.1–46.3)
EOSINOPHIL # BLD AUTO: 0.08 X10(3) UL (ref 0–0.7)
EOSINOPHIL NFR BLD AUTO: 1.1 %
ERYTHROCYTE [DISTWIDTH] IN BLOOD BY AUTOMATED COUNT: 13.4 % (ref 11–15)
GLUCOSE BLDC GLUCOMTR-MCNC: 130 MG/DL (ref 70–99)
GLUCOSE BLDC GLUCOMTR-MCNC: 96 MG/DL (ref 70–99)
GLUCOSE UR-MCNC: >1000 MG/DL
HCT VFR BLD AUTO: 39.2 %
HCT VFR BLD AUTO: 39.7 %
HCT VFR BLD AUTO: 50.4 %
HGB BLD-MCNC: 13.6 G/DL
HGB BLD-MCNC: 14.4 G/DL
HGB BLD-MCNC: 18.2 G/DL
HGB UR QL STRIP.AUTO: NEGATIVE
IMM GRANULOCYTES # BLD AUTO: 0.04 X10(3) UL (ref 0–1)
IMM GRANULOCYTES NFR BLD: 0.5 %
KETONES UR-MCNC: 20 MG/DL
LEUKOCYTE ESTERASE UR QL STRIP.AUTO: NEGATIVE
LYMPHOCYTES # BLD AUTO: 2.51 X10(3) UL (ref 1–4)
LYMPHOCYTES NFR BLD AUTO: 33.6 %
MCH RBC QN AUTO: 30.8 PG (ref 26–34)
MCHC RBC AUTO-ENTMCNC: 36.1 G/DL (ref 31–37)
MCV RBC AUTO: 85.3 FL
MONOCYTES # BLD AUTO: 0.41 X10(3) UL (ref 0.1–1)
MONOCYTES NFR BLD AUTO: 5.5 %
NEUTROPHILS # BLD AUTO: 4.37 X10 (3) UL (ref 1.5–7.7)
NEUTROPHILS # BLD AUTO: 4.37 X10(3) UL (ref 1.5–7.7)
NEUTROPHILS NFR BLD AUTO: 58.6 %
NITRITE UR QL STRIP.AUTO: NEGATIVE
PH UR: 5.5 [PH] (ref 5–8)
PLATELET # BLD AUTO: 242 10(3)UL (ref 150–450)
POTASSIUM SERPL-SCNC: 3.5 MMOL/L (ref 3.5–5.1)
PROT UR-MCNC: NEGATIVE MG/DL
RBC # BLD AUTO: 5.91 X10(6)UL
SP GR UR STRIP: 1.02 (ref 1–1.03)
UROBILINOGEN UR STRIP-ACNC: NORMAL
WBC # BLD AUTO: 7.5 X10(3) UL (ref 4–11)

## 2024-07-22 PROCEDURE — 99233 SBSQ HOSP IP/OBS HIGH 50: CPT | Performed by: INTERNAL MEDICINE

## 2024-07-22 RX ORDER — PANTOPRAZOLE SODIUM 20 MG/1
20 TABLET, DELAYED RELEASE ORAL DAILY
Qty: 30 TABLET | Refills: 0 | Status: SHIPPED | OUTPATIENT
Start: 2024-07-22

## 2024-07-22 RX ORDER — POTASSIUM CHLORIDE 20 MEQ/1
40 TABLET, EXTENDED RELEASE ORAL EVERY 4 HOURS
Status: COMPLETED | OUTPATIENT
Start: 2024-07-22 | End: 2024-07-22

## 2024-07-22 NOTE — PLAN OF CARE
CIWA Q2 with no changes. Emesis resolved. Continue IVFs. Plan for MRI. Monitor BP and BS      Problem: Patient Centered Care  Goal: Patient preferences are identified and integrated in the patient's plan of care  Description: Interventions:  - What would you like us to know as we care for you?   - Provide timely, complete, and accurate information to patient/family  - Incorporate patient and family knowledge, values, beliefs, and cultural backgrounds into the planning and delivery of care  - Encourage patient/family to participate in care and decision-making at the level they choose  - Honor patient and family perspectives and choices  Outcome: Progressing     Problem: SAFETY ADULT - FALL  Goal: Free from fall injury  Description: INTERVENTIONS:  - Assess pt frequently for physical needs  - Identify cognitive and physical deficits and behaviors that affect risk of falls.  - [place] fall precautions as indicated by assessment.  - Educate pt/family on patient safety including physical limitations  - Instruct pt to call for assistance with activity based on assessment  - Modify environment to reduce risk of injury  - Provide assistive devices as appropriate  - Consider OT/PT consult to assist with strengthening/mobility  - Encourage toileting schedule  Outcome: Progressing     Problem: Patient/Family Goals  Goal: Patient/Family Short Term Goal  Description: Patient's Short Term Goal: To go home    Interventions:   - CIWA protocol, neuro work-up, neurologist consultation  - See additional Care Plan goals for specific interventions  Outcome: Progressing  Goal: Patient/Family Long Term Goal  Description: Patient's Long Term Goal:     Interventions:  -  - See additional Care Plan goals for specific interventions  Outcome: Progressing     Problem: NEUROLOGICAL - ADULT  Goal: Achieves stable or improved neurological status  Description: INTERVENTIONS  - Assess for and report changes in neurological status  - Initiate  measures to prevent increased intracranial pressure  - Maintain blood pressure and fluid volume within ordered parameters to optimize cerebral perfusion and minimize risk of hemorrhage  - Monitor temperature, glucose, and sodium. Initiate appropriate interventions as ordered  Outcome: Progressing     Problem: Impaired Cognition  Goal: Patient will exhibit improved attention, thought processing and/or memory  Description: Interventions:    Outcome: Progressing

## 2024-07-22 NOTE — PLAN OF CARE
Problem: Patient Centered Care  Goal: Patient preferences are identified and integrated in the patient's plan of care  Description: Interventions:  - What would you like us to know as we care for you? Im from home   - Provide timely, complete, and accurate information to patient/family  - Incorporate patient and family knowledge, values, beliefs, and cultural backgrounds into the planning and delivery of care  - Encourage patient/family to participate in care and decision-making at the level they choose  - Honor patient and family perspectives and choices  Outcome: Progressing     Problem: SAFETY ADULT - FALL  Goal: Free from fall injury  Description: INTERVENTIONS:  - Assess pt frequently for physical needs  - Identify cognitive and physical deficits and behaviors that affect risk of falls.  - [place] fall precautions as indicated by assessment.  - Educate pt/family on patient safety including physical limitations  - Instruct pt to call for assistance with activity based on assessment  - Modify environment to reduce risk of injury  - Provide assistive devices as appropriate  - Consider OT/PT consult to assist with strengthening/mobility  - Encourage toileting schedule  Outcome: Progressing     Problem: Patient/Family Goals  Goal: Patient/Family Short Term Goal  Description: Patient's Short Term Goal: To go home    Interventions:   - CIWA protocol, neuro work-up, neurologist consultation  - See additional Care Plan goals for specific interventions  Outcome: Progressing  Goal: Patient/Family Long Term Goal  Description: Patient's Long Term Goal: Go home     Interventions:  - See additional Care Plan goals for specific interventions  Outcome: Progressing     Problem: NEUROLOGICAL - ADULT  Goal: Achieves stable or improved neurological status  Description: INTERVENTIONS  - Assess for and report changes in neurological status  - Initiate measures to prevent increased intracranial pressure  - Maintain blood pressure  and fluid volume within ordered parameters to optimize cerebral perfusion and minimize risk of hemorrhage  - Monitor temperature, glucose, and sodium. Initiate appropriate interventions as ordered  Outcome: Progressing

## 2024-07-22 NOTE — PLAN OF CARE
Pt A/Ox3; forgetful at times. RA. No complaints of nausea or Gi upset. MRI unable to perform due to hair extension- neuro aware and ok with no MRI. CIWA's dc'd per pcp due to no withdrawal symptoms and pt only drinks 2-3 drinks occasionally per week. U/A collected per son request. GI and neuro cleared for dc. DC order in place. AVS and education provided. Home with family. No other needs.     Problem: Patient Centered Care  Goal: Patient preferences are identified and integrated in the patient's plan of care  Description: Interventions:  - What would you like us to know as we care for you? Im from home   - Provide timely, complete, and accurate information to patient/family  - Incorporate patient and family knowledge, values, beliefs, and cultural backgrounds into the planning and delivery of care  - Encourage patient/family to participate in care and decision-making at the level they choose  - Honor patient and family perspectives and choices  Outcome: Progressing     Problem: SAFETY ADULT - FALL  Goal: Free from fall injury  Description: INTERVENTIONS:  - Assess pt frequently for physical needs  - Identify cognitive and physical deficits and behaviors that affect risk of falls.  - [place] fall precautions as indicated by assessment.  - Educate pt/family on patient safety including physical limitations  - Instruct pt to call for assistance with activity based on assessment  - Modify environment to reduce risk of injury  - Provide assistive devices as appropriate  - Consider OT/PT consult to assist with strengthening/mobility  - Encourage toileting schedule  Outcome: Progressing     Problem: Patient/Family Goals  Goal: Patient/Family Short Term Goal  Description: Patient's Short Term Goal: To go home    Interventions:   - CIWA protocol, neuro work-up, neurologist consultation  - See additional Care Plan goals for specific interventions  Outcome: Progressing  Goal: Patient/Family Long Term Goal  Description:  Patient's Long Term Goal: Go home     Interventions:  - See additional Care Plan goals for specific interventions  Outcome: Progressing     Problem: NEUROLOGICAL - ADULT  Goal: Achieves stable or improved neurological status  Description: INTERVENTIONS  - Assess for and report changes in neurological status  - Initiate measures to prevent increased intracranial pressure  - Maintain blood pressure and fluid volume within ordered parameters to optimize cerebral perfusion and minimize risk of hemorrhage  - Monitor temperature, glucose, and sodium. Initiate appropriate interventions as ordered  Outcome: Progressing     Problem: Impaired Cognition  Goal: Patient will exhibit improved attention, thought processing and/or memory  Description: Interventions:    Outcome: Progressing

## 2024-07-22 NOTE — PROGRESS NOTES
[facility]     Gastroenterology Progress Note    Olga Vicente Patient Status:  Inpatient    3/6/1950 MRN [id number]   Location [facility] 3W/SW Attending Megan Villalobos*   Hosp Day # 1 PCP Jenny Suazo MD       Subjective:   No bowel movement, no abdominal pain, no fever. No nausea or emesis.     Objective:   Blood pressure 147/81, pulse 73, temperature 98.4 °F (36.9 °C), temperature source Oral, resp. rate 18, height 5' 2\" (1.575 m), weight 130 lb 8 oz (59.2 kg), SpO2 99%, not currently breastfeeding. Body mass index is 23.87 kg/m².    General: awake, alert and oriented, no acute distress  HEENT: moist mucus membranes  PULM: no conversational dyspnea  CARDIOVASCULAR: regular rate and rhythm, the extremities are warm and well perfused  GI: soft, non-tender, non-distended, + BS, no rebound/guarding   EXTREMITIES: no edema, moving all extremities  SKIN: no visible rash  NEURO: appropriate and interactive    Assessment and Plan:   Olga Vicente is a a(n) 74 year old female w/ a history of TIA, HTN, ETOH, who presents with slurred speech undergoing stroke evaluation for which GI consulted for one episode of dark emesis. No further emesis. No signs of overt bleeding. Hgb remains at normal, no signs of anemia. Low suspicion for acute upper GI bleed. Ok to resume diet.    GI will be available as needed, please call with questions    Francheska Sanford MD  [facility] Gastroenterology      Results:     Lab Results   Component Value Date    WBC 6.7 2024    HGB 14.4 2024    HCT 39.7 2024    .0 2024    CREATSERUM 0.87 2024    BUN 11 2024     2024    K 3.5 2024     2024    CO2 20.0 (L) 2024     (H) 2024    CA 9.0 2024    ALB 4.9 (H) 2024    ALKPHO 107 2024    BILT 0.7 2024    TP 7.9 2024    AST 22 2024    ALT 14 2024    PTT 24.3 2024     INR 0.91 07/20/2024    TSH 1.170 05/25/2021    DDIMER 0.86 (H) 07/16/2020    ESRML 44 (H) 07/15/2020    CRP 11.50 (H) 07/16/2020    TROP <0.045 07/14/2020    CK 48 07/15/2020    ETOH 207 (H) 07/20/2024       CT STROKE CTA BRAIN/CTA NECK (W IV)(CPT=70496/21010)    Result Date: 7/21/2024  CONCLUSION:  1. No major vessel occlusion, hemodynamically significant stenosis, dissection, AVM or aneurysm. 2. Moderate narrowing proximal P2 segment of left posterior cerebral. 3. Mild atherosclerotic narrowing of internal carotids. 4. Changes of chronic small vessel disease in both cerebral hemispheres.   No major discrepancy with preliminary [facility] radiology report.   Dictated by (CST): Chiki De La Cruz MD on 7/21/2024 at 5:24 AM     Finalized by (CST): Chiki De La Cruz MD on 7/21/2024 at 5:34 AM          CT STROKE BRAIN (NO IV)(CPT=70450)    Result Date: 7/21/2024  CONCLUSION:  1. No acute infarct or hemorrhage. 2. 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 3. Chronic lacunar infarct at junction between basal ganglia and genu of left internal capsule. 4. Changes of chronic small vessel disease in cerebral white matter. 5. Large vessel intracranial atherosclerosis. 6. No major discrepancy with preliminary [facility] radiology report.     Dictated by (CST): Chiki De La Cruz MD on 7/21/2024 at 5:19 AM     Finalized by (CST): Chiki De La Cruz MD on 7/21/2024 at 5:23 AM         EKG 12 Lead    Result Date: 7/21/2024  Normal sinus rhythm Nonspecific T wave abnormality Abnormal ECG No previous ECGs found in [facility] Confirmed by DEANDRE ALDANA, EMMA ([id number]) on 7/21/2024 11:00:19 AM

## 2024-07-22 NOTE — DISCHARGE PLANNING
Patient was provided with discharge instructions, education, and follow up information. Patient's son present for discharge instructions with patient's consent. Prescriptions were already sent electronically to patient's pharmacy. Patient verbalizes understanding of follow up information, specifically PCP, GI, and neurology. Patient has no questions after reviewing all instructions and will be going home.     Daly PLAZA, Discharge Leader [id number]

## 2024-07-22 NOTE — PROGRESS NOTES
[facility] received consultation for [facility] protocol and alcohol resources.      Patient with history of alcohol use was brought to the ED for eval of slurred speech. Patient was drinking martinis at dinner. Alcohol level was 204 on admission. Per chart review, patient with history of alcohol, benzo, and synthetic marijuana use in 2017. She is not on any psychotropic medications. No history of MARJORIE tx noted.     [facility] provided substance use treatment facilities located within 10 miles of listed address and covered by [facility] and [facility] network.     [facility]  [id number]

## 2024-07-22 NOTE — DISCHARGE INSTRUCTIONS
Referrals for Substance Use Treatment:    [facility]   [address] [place] [address]  [phone]    [facility]  [address]  [address]  [place] [address]  [phone]    [facility]  [address] [place] [address]  [phone]    [address] [address] [phone]

## 2024-07-22 NOTE — PROGRESS NOTES
[facility]  part of [facility]    Progress Note    Olga Vicente Patient Status:  Inpatient    3/6/1950 MRN [id number]   Location [facility] 3W/SW Attending Megan Villalobos*   Hosp Day # 1 PCP Jenny Suazo MD       Subjective:   Olga Vicente is a(n) 74 year old female ***    Objective:   Blood pressure 147/81, pulse 73, temperature 98.4 °F (36.9 °C), temperature source Oral, resp. rate 18, height 5' 2\" (1.575 m), weight 130 lb 8 oz (59.2 kg), SpO2 99%, not currently breastfeeding.    General appearance: alert and cooperative  Head: Normocephalic,atraumatic. PERRL  Neck: no adenopathy, no carotid bruit, no JVD and supple, symmetrical  Pulmonary:  clear to auscultation bilaterally  Cardiovascular: S1, S2 normal, no murmur, click, rub or gallop, regular rate and rhythm  Abdominal: soft, non-tender; bowel sounds normal; no masses,  no organomegaly  Extremities:no edema  Pulses: 2+ and symmetric  Neurologic: Alert and oriented X 3, normal strength and tone. Normal symmetric reflexes    Current Facility-Administered Medications   Medication Dose Route Frequency    aspirin DR tab 325 mg  325 mg Oral Daily    atorvastatin (Lipitor) tab 20 mg  20 mg Oral Daily    glucose (Dex4) 15 GM/59ML oral liquid 15 g  15 g Oral Q15 Min PRN    Or    glucose (Glutose) 40% oral gel 15 g  15 g Oral Q15 Min PRN    Or    glucose-vitamin C (Dex-4) chewable tab 4 tablet  4 tablet Oral Q15 Min PRN    Or    dextrose 50% injection 50 mL  50 mL Intravenous Q15 Min PRN    Or    glucose (Dex4) 15 GM/59ML oral liquid 30 g  30 g Oral Q15 Min PRN    Or    glucose (Glutose) 40% oral gel 30 g  30 g Oral Q15 Min PRN    Or    glucose-vitamin C (Dex-4) chewable tab 8 tablet  8 tablet Oral Q15 Min PRN    insulin aspart (NovoLOG) 100 Units/mL FlexPen 1-5 Units  1-5 Units Subcutaneous TID CC    sodium chloride 0.9% infusion   Intravenous Continuous    melatonin tab 3 mg  3 mg Oral Nightly PRN     acetaminophen (Tylenol) tab 650 mg  650 mg Oral Q6H PRN    ondansetron (Zofran) 4 MG/2ML injection 4 mg  4 mg Intravenous Q4H PRN    pantoprazole (Protonix) 40 mg in sodium chloride 0.9% PF 10 mL IV push  40 mg Intravenous Daily    hydrALAzine (Apresoline) 20 mg/mL injection 10 mg  10 mg Intravenous Q6H PRN         Results:     Lab Results   Component Value Date    WBC 6.7 07/21/2024    HGB 14.4 07/22/2024    HCT 39.7 07/22/2024    .0 07/21/2024    CREATSERUM 0.87 07/21/2024    BUN 11 07/21/2024     07/21/2024    K 3.5 07/22/2024     07/21/2024    CO2 20.0 (L) 07/21/2024     (H) 07/21/2024    CA 9.0 07/21/2024    ALB 4.9 (H) 07/20/2024    ALKPHO 107 07/20/2024    BILT 0.7 07/20/2024    TP 7.9 07/20/2024    AST 22 07/20/2024    ALT 14 07/20/2024    PTT 24.3 07/20/2024    INR 0.91 07/20/2024    TSH 1.170 05/25/2021    DDIMER 0.86 (H) 07/16/2020    ESRML 44 (H) 07/15/2020    CRP 11.50 (H) 07/16/2020    TROP <0.045 07/14/2020    CK 48 07/15/2020    ETOH 207 (H) 07/20/2024       CT STROKE CTA BRAIN/CTA NECK (W IV)(CPT=70496/76475)    Result Date: 7/21/2024  CONCLUSION:  1. No major vessel occlusion, hemodynamically significant stenosis, dissection, AVM or aneurysm. 2. Moderate narrowing proximal P2 segment of left posterior cerebral. 3. Mild atherosclerotic narrowing of internal carotids. 4. Changes of chronic small vessel disease in both cerebral hemispheres.   No major discrepancy with preliminary [facility] radiology report.   Dictated by (CST): Chiki De La Cruz MD on 7/21/2024 at 5:24 AM     Finalized by (CST): Chiki De La Cruz MD on 7/21/2024 at 5:34 AM          CT STROKE BRAIN (NO IV)(CPT=70450)    Result Date: 7/21/2024  CONCLUSION:  1. No acute infarct or hemorrhage. 2. 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 3. Chronic lacunar infarct at junction between basal ganglia and genu of left internal capsule. 4. Changes of chronic small vessel disease  in cerebral white matter. 5. Large vessel intracranial atherosclerosis. 6. No major discrepancy with preliminary [facility] radiology report.     Dictated by (CST): Chiki De La Cruz MD on 7/21/2024 at 5:19 AM     Finalized by (CST): Chiki De La Cruz MD on 7/21/2024 at 5:23 AM         EKG 12 Lead    Result Date: 7/21/2024  Normal sinus rhythm Nonspecific T wave abnormality Abnormal ECG No previous ECGs found in [facility] Confirmed by DEANDRE ALDANA PRATIK ([id number]) on 7/21/2024 11:00:19 AM         Assessment and Plan:     Slurred speech  ***      Alcoholic intoxication with complication (HCC)  ***      Type 2 diabetes mellitus with hyperglycemia, with long-term current use of insulin (HCC)  ***      Hx of transient ischemic attack (TIA)  ***      Dark emesis  ***                ABDIAS BEASLEY MD  7/22/2024

## 2024-07-23 NOTE — PAYOR COMM NOTE
--------------  ADMISSION REVIEW     Payor: SUSIE WOOD O  Subscriber #:  [id number]  Authorization Number: [id number]    Admit date: 7/21/24  Admit time: 12:01 AM       REVIEW DOCUMENTATION:    Patient Seen in: [facility] Emergency Department    History   No chief complaint on file.      HPI    74-year-old female presents to the ER for possible TIA/difficulty speaking around 7 PM.  Patient arrives to the ER at 1030.  Patient is alert and oriented but states she cannot remember where she was prior to coming to the ER.  Patient without any neurological deficits.  Patient had been drinking martinis at dinner prior to arrival.  Concern was also for elevated blood glucose and she is hyperglycemic.    Physical Exam     ED Triage Vitals [07/20/24 2140]   /77   Pulse 94   Resp 20   Temp 97.5 °F (36.4 °C)   Temp src Oral   SpO2 97 %   O2 Device None (Room air)       All measures to prevent infection transmission during my interaction with the patient were taken. The patient was already wearing a droplet mask on my arrival to the room. Personal protective equipment including droplet mask, eye protection, and gloves were worn throughout the duration of the exam.  Handwashing was performed prior to and after the exam.  Stethoscope and any equipment used during my examination was cleaned with super sani-cloth germicidal wipes following the exam.     Physical Exam  Neurological:      General: No focal deficit present.      Mental Status: She is alert. She is disoriented.      Cranial Nerves: No cranial nerve deficit, dysarthria or facial asymmetry.      Sensory: Sensation is intact. No sensory deficit.      Motor: No weakness or tremor.      Coordination: Coordination normal.      Deep Tendon Reflexes: Reflexes are normal and symmetric.     ED Course        Labs Reviewed   COMP METABOLIC PANEL (14) - Abnormal; Notable for the following components:       Result Value    Glucose 315 (*)     CO2 19.0 (*)     Creatinine  1.40 (*)     BUN/CREA Ratio 9.3 (*)     Calculated Osmolality 306 (*)     eGFR-Cr 39 (*)     Albumin 4.9 (*)     All other components within normal limits   ETHYL ALCOHOL - Abnormal; Notable for the following components:    Ethyl Alcohol 207 (*)     All other components within normal limits   POCT GLUCOSE - Abnormal; Notable for the following components:    POC Glucose  326 (*)     All other components within normal limits   CBC W/ DIFFERENTIAL - Abnormal; Notable for the following components:    RBC 5.91 (*)     HGB 18.2 (*)     HCT 50.4 (*)     All other components within normal limits   EKG    Rate, intervals and axes as noted on EKG Report.  Rate: 92  Rhythm: Sinus Rhythm  Reading: ST deviation, normal axis.        Imaging Results Available and Reviewed while in ED: No results found.  ED Medications Administered:   Medications   sodium chloride 0.9 % IV bolus 1,000 mL (1,000 mL Intravenous New Bag 7/20/24 2249)   iopamidol 76% (ISOVUE-370) injection for power injector (70 mL Intravenous Given 7/20/24 2203)         MDM     Vitals:    07/20/24 2140 07/20/24 2147 07/20/24 2245   BP: 144/77     Pulse: 94  86   Resp: 20  13   Temp: 97.5 °F (36.4 °C)     TempSrc: Oral     SpO2: 97%  97%   Weight:  58.4 kg      *Problems Addressed:  Alcoholic intoxication with complication (HCC): acute illness or injury  Hx of transient ischemic attack (TIA): chronic illness or injury  Slurred speech: acute illness or injury  Type 2 diabetes mellitus with hyperglycemia, with long-term current use of insulin (HCC): acute illness or injury    Amount and/or Complexity of Data Reviewed  Independent Historian:      Details: History of obtained from family members were bedside.  States the patient had 2-3 martinis at dinner.  Patient also had elevated blood glucose at dinner.  Patient's speech is now back to baseline in the ER according to family  Labs: ordered. Decision-making details documented in ED Course.  Radiology: ordered and  independent interpretation performed. Decision-making details documented in ED Course.     Details: CT brain interpreted by myself shows no acute intracranial process.        Condition upon leaving the department: Stable    Disposition and Plan     Clinical Impression:  1. Slurred speech    2. Alcoholic intoxication with complication (HCC)    3. Type 2 diabetes mellitus with hyperglycemia, with long-term current use of insulin (HCC)    4. Hx of transient ischemic attack (TIA)        Disposition:  Admit      [facility]  [facility] of [facility]    History & Physical    Olga Vicente Patient Status:  Inpatient    3/6/1950 MRN [id number]   Location [facility] 3W/SW Attending Megan Villalobos*   Hosp Day # 0 PCP Jenny Suazo MD     Date:  2024  Date of Admission:  2024    History provided by:patient  Chief Complaint:     Slurred speech  HPI:   Olga Vicente is a(n) 74 year old female with a history of type 2 diabetes mellitus, bipolar disorder, history of alcohol use loss brought to emergency room for evaluation of slurred speech at around 7 PM.  Patient came into the ER at 10:30 PM when she was awake alert and oriented but cannot remember where she was prior to coming to ER.  Patient states that she has been drinking martini at dinner prior to arrival.  In the ER initial workup was done for stroke which was negative.  Serum alcohol level was 204.  CT brain and CT angio head and neck was negative for acute intracranial process.  So she was admitted for further evaluation and treatment.  Neurology consulted.  Today morning she had an episode of coffee-ground emesis so GI was also consulted    History     Past Medical History:    Atrophic vaginitis    Other and unspecified hyperlipidemia    Perforated eardrum    Squamous cell carcinoma of skin    L upper thigh laser    TIA (transient ischemic attack)    Unspecified essential hypertension     Past Surgical History:    Procedure Laterality Date    Cholecystectomy  7/13    lap aakash    Colonoscopy  2015    repeat 2025    Hysterectomy      partial TAMIR , large firoid and menorrhagia, ovaries remain    Implant left  1988    reduced 2x last one 5 years ago    Implant right  1988    then reduced 2x last one was 5 years ago    Other  2013    breast lifts    Other surgical history  1987    breast implants,     Other surgical history  2006    revision breast implant, repeated in several months    Other surgical history      plastic surgery, neck lift, rhinoplasty, blepharoplasty    Other surgical history      liposuction    Tonsillectomy       Family History   Problem Relation Age of Onset    Hypertension Father     Heart Disorder Father         cad    Lipids Father     Hypertension Mother     Lipids Mother     Diabetes Mother 60    Cancer Mother         skin cancer    Other (Other) Mother     Cancer Maternal Grandmother         skin ca    Heart Disorder Maternal Grandfather 51        fatal MI    Cancer Sister         basal cell skin ca    Diabetes Sister     Other (obesity) Sister     Other (glycogen storage disease) Brother     Other (glycogen storage disease) Brother     Psychiatric Sister         bipolar    Other (ETOH abuse recovering) Brother      Social History:  Social History     Socioeconomic History    Marital status:     Number of children: 2   Tobacco Use    Smoking status: Never    Smokeless tobacco: Never   Vaping Use    Vaping status: Never Used   Substance and Sexual Activity    Alcohol use: Yes     Alcohol/week: 3.0 standard drinks of alcohol     Types: 3 Standard drinks or equivalent per week    Drug use: Yes     Types: Cannabis     Comment: edible marijuana 1x/month   Other Topics Concern    Caffeine Concern No    Exercise Yes     Social Determinants of Health     Food Insecurity: No Food Insecurity (7/21/2024)    Food Insecurity     Food Insecurity: Never true   Transportation Needs: No Transportation Needs  (7/21/2024)    Transportation Needs     Lack of Transportation: No   Housing Stability: Low Risk  (7/21/2024)    Housing Stability     Housing Instability: No     Allergies/Medications:   Allergies:   Allergies   Allergen Reactions    Hydrocodone HALLUCINATION    Sulfa Antibiotics ITCHING     Medications Prior to Admission   Medication Sig    Empagliflozin (JARDIANCE) 25 MG Oral Tab Take 25 mg by mouth daily.    cyanocobalamin 1000 MCG Oral Tab Take 1 tablet (1,000 mcg total) by mouth daily.    nateglinide 60 MG Oral Tab Take 1 tablet (60 mg total) by mouth 2 (two) times daily with meals.    LOSARTAN 50 MG Oral Tab TAKE 1 TABLET(50 MG) BY MOUTH EVERY DAY    JANUVIA 100 MG Oral Tab TAKE 1 TABLET(100 MG) BY MOUTH DAILY    ATORVASTATIN 20 MG Oral Tab TAKE 1 TABLET(20 MG) BY MOUTH DAILY    MILK THISTLE OR Take by mouth.    vitamin E 100 UNITS Oral Cap Take 100 Units by mouth daily.    OCUVITE-LUTEIN Oral Tab Take 1 tablet by mouth daily with breakfast.    Glucose Blood (GELY CONTOUR NEXT TEST) In Vitro Strip 1 strip by Finger stick route 2 (two) times daily. Use as directed.       Assessment/Plan:     Slurred speech  Most likely due to alcohol intoxication since patient did not had any other neurologic deficits  Speech is normal today  CT brain negative   CT angio head and neck negative      Alcoholic intoxication with complication (HCC)  Serum alcohol level 204    Coffee-ground emesis  Could be due to alcoholic gastritis/Kelly-Guzman tear  IV Protonix  Keep n.p.o., IV fluid  Check H&H stat  GI consult      Type 2 diabetes mellitus with hyperglycemia, with long-term current use of insulin (HCC)  Sliding scale insulin      Hx of transient ischemic attack (TIA)  Patient was on aspirin and statin daily    DVT prophylaxis  SCD      Certification      PHYSICIAN Certification of Need for Inpatient Hospitalization - Initial Certification    Patient will require inpatient services that will reasonably be expected to span two  midnight's based on the clinical documentation in H+P.   Based on patients current state of illness, I anticipate that, after discharge, patient will require TBD.       ABDIAS BEASLEY MD  7/21/2024 7/21/24 Neurology   Date of Admission:  7/20/2024  Date of Consult:  7/21/2024  Reason for Consultation:   Acute stroke  History of Present Illness:   Patient is a 74 year old female who was admitted to the hospital for Slurred speech:  History was obtained from the patient herself as well as from the medical record.  She has been having emesis as I walked in the room so history was limited.  She started having speech slurring around 7 PM and arrived to the emergency department around 10:30 PM.  She could not tell exactly why she was in the emergency department and apparently she has been drinking alcohol prior to her coming to the emergency she denies any weakness or numbness in her upper or lower extremities.  NIHSS was 1 for the emergency physician mainly due to minimal dysarthria. She was not a candidate for thrombolysis as she only had minimal dysarthria without drift or weakness in her upper or lower extremities or any facial droop.  Today she is telling me that her speech is back to normal.   No falls, no seizures or seizure-like activity.  She had a CT scan without contrast upon arrival to the emergency department that did not reveal any acute intracranial abnormalities  She also had a CT angiogram of the head and neck that revealed moderate narrowing of the proximal P2 segment of left posterior cerebral artery.  There were no major vessel occlusion or hemodynamically significant stenosis otherwise.   Denies fever, chills, headaches or neck stiffness for me    mpression:     Slurred speech    Alcoholic intoxication with complication (HCC)     Unlikely a vascular event as the only deficit was minimal dysarthria.  Today on my exam she had no dysarthria.  She has been drinking alcohol yesterday.  CT  scan of the head without contrast without acute intracranial abnormalities and CT angiogram with minimal to moderate stenosis of the right P2 segment.  She is on aspirin 325 mg daily and atorvastatin 40 mg daily.           Recommendations:  1- MRI brain without contrast     Thank you for allowing me to participate in the care of your patient.     Giuliana Alejandre MD        7/21/ gi   Reason for Consultation:  Dark emesis     History of Present Illness:  Olga Vicente is a a(n) 74 year old female w/ a history of TIA, HTN, ETOH, who presents with slurred speech. States she was at a dinner party yesterday evening, friends noted slurred speech and brought her to ER. She is undergoing work-up for stroke. This morning felt nauseous and had one episode of emesis which was described as dark. No melena or hematochezia. No abdominal pain. No fever or chills. She drank a chocolate martini last night. Denies chronic issues with abdominal pain, n/v, dysphagia, heartburn. No prior egd or colonoscopy. No nsaid use.      Assessment & Plan   Olga Vicente is a a(n) 74 year old female w/ a history of TIA, HTN, ETOH, who presents with slurred speech undergoing stroke evaluation for which GI consulted for one episode of dark emesis. There is some question whether this was related to chocolate martini consumed last evening. No abdominal pain. No further emesis. Bowel movement this morning was brown. Hgb normal at 15.3, BUN normal. Low suspicion for acute upper GI bleed.      Recommend:  -will empirically give PPI for now  -monitor for symptom recurrence  -ok for diet from my stance  -ok for antiplatelets if needed from neuro stance  -hgb in AM     Francheska Sanford MD  [facility] Gastroenterology    6/22/25 GI    Assessment and Plan:   Olga Vicente is a a(n) 74 year old female w/ a history of TIA, HTN, ETOH, who presents with slurred speech undergoing stroke evaluation for which GI consulted for one episode of dark  emesis. No further emesis. No signs of overt bleeding. Hgb remains at normal, no signs of anemia. Low suspicion for acute upper GI bleed. Ok to resume diet.     GI will be available as needed, please call with questions     Francheska Sanford MD  [facility] Gastroenterology      7/22/24 Nursing     MRI unable to perform due to hair extension- neuro aware and ok with no MRI. CIWA's dc'd per pcp due to no withdrawal symptoms and pt only drinks 2-3 drinks occasionally per week. U/A collected per son request. GI and neuro cleared for dc. DC order in place. AVS and education provided. Home with family.         Discharged: 7/22/2024 1407       MEDICATIONS ADMINISTERED IN LAST 1 DAY:  potassium chloride (Klor-Con M20) tab 40 mEq       Date Action Dose Route User    Discharged on 7/22/2024 7/22/2024 1134 Given 40 mEq Oral Summer Aguila, RN            Vitals (last day) before discharge       Date/Time Temp Pulse Resp BP SpO2 Weight O2 Device O2 Flow Rate (L/min) [facility]    07/22/24 0835 98.4 °F (36.9 °C) 73 18 147/81 99 % -- None (Room air) -- MS    07/22/24 0800 -- 81 -- -- -- -- -- -- MS    07/22/24 0600 -- 73 -- -- -- -- -- --     07/22/24 0554 98.5 °F (36.9 °C) 82 17 132/77 95 % -- None (Room air) --     07/22/24 0523 -- -- -- -- -- 130 lb 8 oz (59.2 kg) -- --     07/22/24 0400 -- 74 -- -- -- -- -- --     07/22/24 0200 -- 84 -- -- -- -- -- --     07/22/24 0000 -- 71 -- -- -- -- -- --     07/21/24 2200 -- 72 -- -- -- -- -- --     07/21/24 2131 -- 69 -- -- -- -- -- --     07/21/24 2003 98.1 °F (36.7 °C) 76 17 130/71 95 % -- None (Room air) --     07/21/24 1900 -- 82 -- -- -- -- -- -- [place]    07/21/24 1400 97.7 °F (36.5 °C) 94 18 143/84 94 % -- None (Room air) -- [place]    07/21/24 1200 97.8 °F (36.6 °C) 91 18 147/82 95 % -- None (Room air) -- FL    07/21/24 1000 -- 90 -- 147/85 -- -- -- -- FL    07/21/24 0800 97.7 °F (36.5 °C) 90 18 180/96 92 % -- None (Room air) -- [place]    07/21/24 0600 -- 93 -- -- -- -- -- -- AM     07/21/24 0515 98.1 °F (36.7 °C) 98 19 166/94 90 % -- None (Room air) -- AM    07/21/24 0400 -- 92 -- -- -- -- -- -- AM    07/21/24 0200 -- 85 -- -- 92 % -- None (Room air) -- AM    07/21/24 0118 -- -- -- -- -- 133 lb 6.1 oz (60.5 kg) -- -- AM    07/21/24 0000 97.3 °F (36.3 °C) 85 18 154/87 96 % -- None (Room air) --     07/21/24 0000 -- -- -- -- -- 133 lb 6.1 oz (60.5 kg) -- -- JOANNA DORMAN RN

## 2024-07-29 NOTE — PAYOR COMM NOTE
--------------  DISCHARGE REVIEW    [place] [facility] [place] #:  [id number]  Authorization Number: [id number]    Admit date: 7/21/24  Admit time:  12:01 AM  Discharge Date: 7/22/2024  2:07 PM     Admitting Physician: Megan Villalobos MD  Attending Physician:  No att. providers found  Primary Care Physician: Jenny Suazo MD      REVIEWER COMMENTS

## 2025-05-19 NOTE — PLAN OF CARE
Iv abx continues. Needs urine specimen. Patient instructed - verbalized understanding.     Problem: Patient Centered Care  Goal: Patient preferences are identified and integrated in the patient's plan of care  Description  Interventions:  - What would you guidelines  Outcome: Progressing     Problem: SAFETY ADULT - FALL  Goal: Free from fall injury  Description  INTERVENTIONS:  - Assess pt frequently for physical needs  - Identify cognitive and physical deficits and behaviors that affect risk of falls.   - I 179.8

## (undated) NOTE — ED AVS SNAPSHOT
Shanti Lam   MRN: [id number]    Department:  [facility] Emergency Department   Date of Visit:  10/13/2017           Disclosure     Insurance plans vary and the physician(s) referred by the ER may not be covered by your plan.  Please con CARE PHYSICIAN AT ONCE OR RETURN IMMEDIATELY TO THE EMERGENCY DEPARTMENT. If you have been prescribed any medication(s), please fill your prescription right away and begin taking the medication(s) as directed.   If you believe that any of the medications

## (undated) NOTE — ED AVS SNAPSHOT
Giorgio Garcia   MRN: [id number]    Department:  [facility] Emergency Department   Date of Visit:  1/13/2018           Disclosure     Insurance plans vary and the physician(s) referred by the ER may not be covered by your plan.  Please cont within the next three months to obtain basic health screening including reassessment of your blood pressure.     IF THERE IS ANY CHANGE OR WORSENING OF YOUR CONDITION, CALL YOUR PRIMARY CARE PHYSICIAN AT ONCE OR RETURN IMMEDIATELY TO THE EMERGENCY DEPARTMEN